# Patient Record
Sex: MALE | Race: WHITE | NOT HISPANIC OR LATINO | Employment: FULL TIME | URBAN - METROPOLITAN AREA
[De-identification: names, ages, dates, MRNs, and addresses within clinical notes are randomized per-mention and may not be internally consistent; named-entity substitution may affect disease eponyms.]

---

## 2023-05-15 LAB
ALANINE AMINOTRANSFERASE (SGPT) (U/L) IN SER/PLAS: 12 U/L (ref 10–52)
ALBUMIN (G/DL) IN SER/PLAS: 4.1 G/DL (ref 3.4–5)
ALKALINE PHOSPHATASE (U/L) IN SER/PLAS: 74 U/L (ref 33–136)
ANION GAP IN SER/PLAS: 11 MMOL/L (ref 10–20)
ASPARTATE AMINOTRANSFERASE (SGOT) (U/L) IN SER/PLAS: 13 U/L (ref 9–39)
BILIRUBIN TOTAL (MG/DL) IN SER/PLAS: 0.3 MG/DL (ref 0–1.2)
CALCIUM (MG/DL) IN SER/PLAS: 9 MG/DL (ref 8.6–10.3)
CARBON DIOXIDE, TOTAL (MMOL/L) IN SER/PLAS: 25 MMOL/L (ref 21–32)
CHLORIDE (MMOL/L) IN SER/PLAS: 106 MMOL/L (ref 98–107)
CHOLESTEROL (MG/DL) IN SER/PLAS: 175 MG/DL (ref 0–199)
CHOLESTEROL IN HDL (MG/DL) IN SER/PLAS: 55.5 MG/DL
CHOLESTEROL/HDL RATIO: 3.2
CREATININE (MG/DL) IN SER/PLAS: 0.9 MG/DL (ref 0.5–1.3)
ERYTHROCYTE DISTRIBUTION WIDTH (RATIO) BY AUTOMATED COUNT: 13.8 % (ref 11.5–14.5)
ERYTHROCYTE MEAN CORPUSCULAR HEMOGLOBIN CONCENTRATION (G/DL) BY AUTOMATED: 32.3 G/DL (ref 32–36)
ERYTHROCYTE MEAN CORPUSCULAR VOLUME (FL) BY AUTOMATED COUNT: 94 FL (ref 80–100)
ERYTHROCYTES (10*6/UL) IN BLOOD BY AUTOMATED COUNT: 4.92 X10E12/L (ref 4.5–5.9)
GFR MALE: >90 ML/MIN/1.73M2
GLUCOSE (MG/DL) IN SER/PLAS: 106 MG/DL (ref 74–99)
HEMATOCRIT (%) IN BLOOD BY AUTOMATED COUNT: 46.4 % (ref 41–52)
HEMOGLOBIN (G/DL) IN BLOOD: 15 G/DL (ref 13.5–17.5)
LDL: 102 MG/DL (ref 0–99)
LEUKOCYTES (10*3/UL) IN BLOOD BY AUTOMATED COUNT: 8.7 X10E9/L (ref 4.4–11.3)
NRBC (PER 100 WBCS) BY AUTOMATED COUNT: 0 /100 WBC (ref 0–0)
PLATELETS (10*3/UL) IN BLOOD AUTOMATED COUNT: 298 X10E9/L (ref 150–450)
POTASSIUM (MMOL/L) IN SER/PLAS: 4.5 MMOL/L (ref 3.5–5.3)
PROTEIN TOTAL: 6.8 G/DL (ref 6.4–8.2)
SODIUM (MMOL/L) IN SER/PLAS: 137 MMOL/L (ref 136–145)
THYROTROPIN (MIU/L) IN SER/PLAS BY DETECTION LIMIT <= 0.05 MIU/L: 1.98 MIU/L (ref 0.44–3.98)
TRIGLYCERIDE (MG/DL) IN SER/PLAS: 90 MG/DL (ref 0–149)
UREA NITROGEN (MG/DL) IN SER/PLAS: 17 MG/DL (ref 6–23)
VLDL: 18 MG/DL (ref 0–40)

## 2023-09-25 PROBLEM — I49.3 PVC (PREMATURE VENTRICULAR CONTRACTION): Status: ACTIVE | Noted: 2023-09-25

## 2023-09-25 PROBLEM — K21.9 GERD (GASTROESOPHAGEAL REFLUX DISEASE): Status: ACTIVE | Noted: 2023-09-25

## 2023-09-25 PROBLEM — I47.29 NSVT (NONSUSTAINED VENTRICULAR TACHYCARDIA) (MULTI): Status: ACTIVE | Noted: 2023-09-25

## 2023-09-25 PROBLEM — E03.9 HYPOTHYROIDISM: Status: ACTIVE | Noted: 2023-09-25

## 2023-09-25 PROBLEM — F41.9 ANXIETY: Status: ACTIVE | Noted: 2023-09-25

## 2023-09-25 PROBLEM — C73 MALIGNANT NEOPLASM OF THYROID GLAND (MULTI): Status: ACTIVE | Noted: 2023-09-25

## 2023-09-25 PROBLEM — R93.1 ABNORMAL ECHOCARDIOGRAM: Status: ACTIVE | Noted: 2023-09-25

## 2023-09-25 PROBLEM — Z96.651 HISTORY OF RIGHT KNEE JOINT REPLACEMENT: Status: ACTIVE | Noted: 2023-09-25

## 2023-09-25 PROBLEM — E66.9 CLASS 2 OBESITY WITH BODY MASS INDEX (BMI) OF 35.0 TO 35.9 IN ADULT: Status: ACTIVE | Noted: 2023-09-25

## 2023-09-25 PROBLEM — N40.0 BPH (BENIGN PROSTATIC HYPERPLASIA): Status: ACTIVE | Noted: 2023-09-25

## 2023-09-25 PROBLEM — D18.01 HEMANGIOMA OF SKIN AND SUBCUTANEOUS TISSUE: Status: ACTIVE | Noted: 2022-02-09

## 2023-09-25 PROBLEM — L02.31 ABSCESS, GLUTEAL, RIGHT: Status: ACTIVE | Noted: 2023-09-25

## 2023-09-25 PROBLEM — K57.32 DIVERTICULITIS OF COLON: Status: ACTIVE | Noted: 2023-09-25

## 2023-09-25 PROBLEM — R93.89 ABNORMAL MRI: Status: ACTIVE | Noted: 2023-09-25

## 2023-09-25 PROBLEM — L82.1 OTHER SEBORRHEIC KERATOSIS: Status: ACTIVE | Noted: 2022-02-09

## 2023-09-25 PROBLEM — S09.90XA TRAUMATIC INJURY OF HEAD: Status: ACTIVE | Noted: 2023-09-25

## 2023-09-25 PROBLEM — E04.1 SOLITARY THYROID NODULE: Status: ACTIVE | Noted: 2023-09-25

## 2023-09-25 PROBLEM — L05.91 INFECTED PILONIDAL CYST: Status: ACTIVE | Noted: 2023-09-25

## 2023-09-25 PROBLEM — L73.2 HIDRADENITIS SUPPURATIVA: Status: ACTIVE | Noted: 2022-02-09

## 2023-09-25 PROBLEM — I10 HYPERTENSION, ESSENTIAL, BENIGN: Status: ACTIVE | Noted: 2023-09-25

## 2023-09-25 PROBLEM — M19.90 DJD (DEGENERATIVE JOINT DISEASE): Status: ACTIVE | Noted: 2023-09-25

## 2023-09-25 PROBLEM — L81.4 OTHER MELANIN HYPERPIGMENTATION: Status: ACTIVE | Noted: 2022-02-09

## 2023-09-25 PROBLEM — E27.8 LEFT ADRENAL MASS (MULTI): Status: ACTIVE | Noted: 2023-09-25

## 2023-09-25 PROBLEM — E66.812 CLASS 2 OBESITY WITH BODY MASS INDEX (BMI) OF 35.0 TO 35.9 IN ADULT: Status: ACTIVE | Noted: 2023-09-25

## 2023-09-25 PROBLEM — D22.5 MELANOCYTIC NEVI OF TRUNK: Status: ACTIVE | Noted: 2022-02-09

## 2023-09-25 PROBLEM — D18.03 LIVER HEMANGIOMA: Status: ACTIVE | Noted: 2023-09-25

## 2023-09-25 PROBLEM — L08.9 LOCALIZED INFECTION OF SKIN: Status: ACTIVE | Noted: 2023-09-25

## 2023-09-25 PROBLEM — S39.92XA INJURY OF COCCYX: Status: ACTIVE | Noted: 2023-09-25

## 2023-09-25 PROBLEM — S69.90XA: Status: ACTIVE | Noted: 2023-09-25

## 2023-09-25 RX ORDER — NAPROXEN SODIUM 220 MG/1
1 TABLET, FILM COATED ORAL DAILY
COMMUNITY
Start: 2022-02-08

## 2023-09-25 RX ORDER — POLYETHYLENE GLYCOL 3350 17 G/17G
17 POWDER, FOR SOLUTION ORAL DAILY PRN
COMMUNITY

## 2023-09-25 RX ORDER — DIAZEPAM 5 MG/1
1 TABLET ORAL DAILY PRN
COMMUNITY
Start: 2021-12-07

## 2023-09-25 RX ORDER — CELECOXIB 200 MG/1
1 CAPSULE ORAL DAILY PRN
COMMUNITY
Start: 2022-03-10 | End: 2023-10-24 | Stop reason: ALTCHOICE

## 2023-09-25 RX ORDER — HYDROGEN PEROXIDE 3 %
1 SOLUTION, NON-ORAL MISCELLANEOUS DAILY
COMMUNITY
Start: 2021-11-02 | End: 2024-02-09

## 2023-09-25 RX ORDER — CLINDAMYCIN PHOSPHATE 11.9 MG/ML
1 SOLUTION TOPICAL
COMMUNITY
Start: 2022-02-09

## 2023-09-25 RX ORDER — MULTIVITAMIN
1 TABLET ORAL DAILY
COMMUNITY

## 2023-09-25 RX ORDER — LISINOPRIL 5 MG/1
1 TABLET ORAL DAILY
COMMUNITY
Start: 2022-02-08 | End: 2024-01-30

## 2023-09-25 RX ORDER — METOPROLOL SUCCINATE 100 MG/1
1 TABLET, EXTENDED RELEASE ORAL DAILY
COMMUNITY
Start: 2022-02-08 | End: 2024-02-06

## 2023-09-25 RX ORDER — CLINDAMYCIN PHOSPHATE 10 UG/ML
LOTION TOPICAL
COMMUNITY

## 2023-09-25 RX ORDER — LEVOTHYROXINE SODIUM 125 UG/1
1 TABLET ORAL DAILY
COMMUNITY
Start: 2014-10-15 | End: 2023-10-13

## 2023-09-25 RX ORDER — LANOLIN ALCOHOL/MO/W.PET/CERES
1 CREAM (GRAM) TOPICAL 2 TIMES DAILY
COMMUNITY
End: 2023-11-29

## 2023-09-28 ENCOUNTER — LAB (OUTPATIENT)
Dept: LAB | Facility: LAB | Age: 68
End: 2023-09-28
Payer: COMMERCIAL

## 2023-09-28 LAB — TOBACCO SCREEN, URINE: NEGATIVE

## 2023-10-13 DIAGNOSIS — E03.9 HYPOTHYROIDISM, UNSPECIFIED: ICD-10-CM

## 2023-10-13 RX ORDER — LEVOTHYROXINE SODIUM 125 UG/1
125 TABLET ORAL DAILY
Qty: 90 TABLET | Refills: 1 | Status: SHIPPED | OUTPATIENT
Start: 2023-10-13

## 2023-10-24 ENCOUNTER — OFFICE VISIT (OUTPATIENT)
Dept: PRIMARY CARE | Facility: CLINIC | Age: 68
End: 2023-10-24
Payer: COMMERCIAL

## 2023-10-24 VITALS
WEIGHT: 286 LBS | TEMPERATURE: 97.3 F | HEIGHT: 74 IN | SYSTOLIC BLOOD PRESSURE: 117 MMHG | DIASTOLIC BLOOD PRESSURE: 81 MMHG | BODY MASS INDEX: 36.7 KG/M2 | HEART RATE: 73 BPM

## 2023-10-24 DIAGNOSIS — R73.01 IFG (IMPAIRED FASTING GLUCOSE): ICD-10-CM

## 2023-10-24 DIAGNOSIS — Z87.891 FORMER SMOKER: ICD-10-CM

## 2023-10-24 DIAGNOSIS — Z00.00 MEDICARE ANNUAL WELLNESS VISIT, SUBSEQUENT: Primary | ICD-10-CM

## 2023-10-24 PROCEDURE — 1036F TOBACCO NON-USER: CPT | Performed by: FAMILY MEDICINE

## 2023-10-24 PROCEDURE — 1170F FXNL STATUS ASSESSED: CPT | Performed by: FAMILY MEDICINE

## 2023-10-24 PROCEDURE — 3008F BODY MASS INDEX DOCD: CPT | Performed by: FAMILY MEDICINE

## 2023-10-24 PROCEDURE — G0439 PPPS, SUBSEQ VISIT: HCPCS | Performed by: FAMILY MEDICINE

## 2023-10-24 PROCEDURE — 1159F MED LIST DOCD IN RCRD: CPT | Performed by: FAMILY MEDICINE

## 2023-10-24 PROCEDURE — 1160F RVW MEDS BY RX/DR IN RCRD: CPT | Performed by: FAMILY MEDICINE

## 2023-10-24 PROCEDURE — 3074F SYST BP LT 130 MM HG: CPT | Performed by: FAMILY MEDICINE

## 2023-10-24 PROCEDURE — 3079F DIAST BP 80-89 MM HG: CPT | Performed by: FAMILY MEDICINE

## 2023-10-24 ASSESSMENT — PATIENT HEALTH QUESTIONNAIRE - PHQ9
2. FEELING DOWN, DEPRESSED OR HOPELESS: NOT AT ALL
1. LITTLE INTEREST OR PLEASURE IN DOING THINGS: NOT AT ALL
SUM OF ALL RESPONSES TO PHQ9 QUESTIONS 1 AND 2: 0

## 2023-10-24 ASSESSMENT — ACTIVITIES OF DAILY LIVING (ADL)
TAKING_MEDICATION: INDEPENDENT
GROCERY_SHOPPING: INDEPENDENT
DOING_HOUSEWORK: INDEPENDENT
BATHING: INDEPENDENT
DRESSING: INDEPENDENT
MANAGING_FINANCES: INDEPENDENT

## 2023-10-24 NOTE — PROGRESS NOTES
Subjective     Patient ID: Julio White is a 68 y.o. male who presents for Annual Exam (AWV):    Problem List Items Addressed This Visit    None     Past Medical History:   Diagnosis Date   • Body mass index (BMI) 37.0-37.9, adult 11/01/2021    BMI 37.0-37.9, adult   • Obesity, unspecified 08/10/2022    Class 2 obesity with body mass index (BMI) of 38.0 to 38.9 in adult   • Other conditions influencing health status 11/01/2021    Patient new to provider   • Personal history of other diseases of the circulatory system 08/04/2022    History of blood pressure problems   • Personal history of other diseases of the respiratory system 10/19/2022    History of acute sinusitis   • Personal history of other endocrine, nutritional and metabolic disease 11/01/2021    History of obesity      Past Surgical History:   Procedure Laterality Date   • OTHER SURGICAL HISTORY  10/05/2021    Hip surgery   • OTHER SURGICAL HISTORY  10/05/2021    Thyroid surgery   • OTHER SURGICAL HISTORY  09/26/2022    Colonoscopy   • TOTAL KNEE ARTHROPLASTY Right 11/2022      Family History   Problem Relation Name Age of Onset   • Cancer Mother     • Other (cardiac disorder) Father        Social History     Socioeconomic History   • Marital status:      Spouse name: Not on file   • Number of children: Not on file   • Years of education: Not on file   • Highest education level: Not on file   Occupational History   • Not on file   Tobacco Use   • Smoking status: Former     Types: Cigarettes   • Smokeless tobacco: Never   Substance and Sexual Activity   • Alcohol use: Yes     Alcohol/week: 12.0 standard drinks of alcohol     Types: 12 Cans of beer per week   • Drug use: Not Currently   • Sexual activity: Not on file   Other Topics Concern   • Not on file   Social History Narrative   • Not on file     Social Determinants of Health     Financial Resource Strain: Not on file   Food Insecurity: Not on file   Transportation Needs: Not on file    Physical Activity: Not on file   Stress: Not on file   Social Connections: Not on file   Intimate Partner Violence: Not on file   Housing Stability: Not on file      Patient has no known allergies.   Current Outpatient Medications   Medication Sig Dispense Refill   • aspirin 81 mg chewable tablet Chew 1 tablet (81 mg) once daily. resume after you complete course of Aspirin 81mg Bid X 30 days, to help reduce your risk for blood clots     • clindamycin (Cleocin T) 1 % external solution 1 Application.     • clindamycin (Cleocin T) 1 % lotion Clindamycin Phosphate 1 % External Lotion   Refills: 0       Active     • diazePAM (Valium) 5 mg tablet Take 1 tablet (5 mg) by mouth once daily as needed.     • esomeprazole (NexIUM) 20 mg DR capsule Take 1 capsule (20 mg) by mouth once daily.     • levothyroxine (Synthroid, Levoxyl) 125 mcg tablet TAKE 1 TABLET BY MOUTH EVERY DAY 90 tablet 1   • lisinopril 5 mg tablet Take 1 tablet (5 mg) by mouth once daily.     • magnesium oxide (Mag-Ox) 400 mg (241.3 mg magnesium) tablet Take 1 tablet (400 mg) by mouth twice a day.     • metoprolol succinate XL (Toprol-XL) 100 mg 24 hr tablet Take 1 tablet (100 mg) by mouth once daily.     • multivitamin tablet Take 1 tablet by mouth once daily.     • polyethylene glycol (Miralax) 17 gram/dose powder Take 17 g by mouth once daily as needed.       No current facility-administered medications for this visit.       Immunization History   Administered Date(s) Administered   • Flu vaccine (IIV4), preservative free *Check age/dose* 09/30/2018, 10/01/2019   • Influenza, High Dose Seasonal, Preservative Free 10/19/2021   • Influenza, Unspecified 10/29/2009, 10/25/2011, 10/01/2014, 10/01/2015, 10/01/2016, 10/01/2021   • Influenza, seasonal, injectable 10/01/2020, 10/01/2023   • Moderna SARS-CoV-2 Vaccination 12/31/2020, 02/11/2021, 02/28/2021, 11/19/2021   • Novel influenza-H1N1-09, preservative-free 11/05/2009, 10/07/2022   • Pfizer Purple Cap  SARS-CoV-2 09/20/2022   • Pneumococcal conjugate vaccine, 13-valent (PREVNAR 13) 10/19/2021   • Pneumococcal polysaccharide vaccine, 23-valent, age 2 years and older (PNEUMOVAX 23) 10/27/2022        Review of Systems     Vitals:    10/24/23 1500   BP: 117/81   Pulse: 73   Temp: 36.3 °C (97.3 °F)     Vitals:    10/24/23 1500   Weight: 130 kg (286 lb)      Physical Exam     ASSESSMENT/PLAN:         Scribe Attestation  By signing my name below, I, Salma Camara LPN , Scribe   attest that this documentation has been prepared under the direction and in the presence of Jim Keller MD.

## 2023-10-26 ASSESSMENT — ENCOUNTER SYMPTOMS
ALLERGIC/IMMUNOLOGIC NEGATIVE: 1
EYES NEGATIVE: 1
GASTROINTESTINAL NEGATIVE: 1
CONSTITUTIONAL NEGATIVE: 1
CARDIOVASCULAR NEGATIVE: 1
ENDOCRINE NEGATIVE: 1
HEMATOLOGIC/LYMPHATIC NEGATIVE: 1
RESPIRATORY NEGATIVE: 1
PSYCHIATRIC NEGATIVE: 1
MUSCULOSKELETAL NEGATIVE: 1

## 2023-10-26 NOTE — PROGRESS NOTES
Isrrael Ba is here today for an annual wellness visit.  He states that he is feeling well and has no new complaints at the present time.  He continues on his medications as noted without side effects.  He sees Dr. Adarsh Klein for cardiology care and Dr. Mireles for electrophysiology care.  He has not had any chest pain shortness of breath lightheadedness or syncope    Patient ID: Julio White is a 68 y.o. male who presents for Annual Exam (AWV):    Problem List Items Addressed This Visit       Medicare annual wellness visit, subsequent - Primary    BMI 36.0-36.9,adult    Relevant Orders    Comprehensive Metabolic Panel    Hemoglobin A1C    IFG (impaired fasting glucose)    Relevant Orders    Comprehensive Metabolic Panel    Hemoglobin A1C    Former smoker      Past Medical History:   Diagnosis Date    Body mass index (BMI) 37.0-37.9, adult 11/01/2021    BMI 37.0-37.9, adult    Obesity, unspecified 08/10/2022    Class 2 obesity with body mass index (BMI) of 38.0 to 38.9 in adult    Other conditions influencing health status 11/01/2021    Patient new to provider    Personal history of other diseases of the circulatory system 08/04/2022    History of blood pressure problems    Personal history of other diseases of the respiratory system 10/19/2022    History of acute sinusitis    Personal history of other endocrine, nutritional and metabolic disease 11/01/2021    History of obesity      Past Surgical History:   Procedure Laterality Date    OTHER SURGICAL HISTORY  10/05/2021    Hip surgery    OTHER SURGICAL HISTORY  10/05/2021    Thyroid surgery    OTHER SURGICAL HISTORY  09/26/2022    Colonoscopy    TOTAL KNEE ARTHROPLASTY Right 11/2022      Family History   Problem Relation Name Age of Onset    Cancer Mother      Other (cardiac disorder) Father        Social History     Socioeconomic History    Marital status:      Spouse name: Not on file    Number of children: Not on file    Years of education: Not on  file    Highest education level: Not on file   Occupational History    Not on file   Tobacco Use    Smoking status: Former     Types: Cigarettes    Smokeless tobacco: Never   Substance and Sexual Activity    Alcohol use: Yes     Alcohol/week: 12.0 standard drinks of alcohol     Types: 12 Cans of beer per week    Drug use: Not Currently    Sexual activity: Not on file   Other Topics Concern    Not on file   Social History Narrative    Not on file     Social Determinants of Health     Financial Resource Strain: Not on file   Food Insecurity: Not on file   Transportation Needs: Not on file   Physical Activity: Not on file   Stress: Not on file   Social Connections: Not on file   Intimate Partner Violence: Not on file   Housing Stability: Not on file      Patient has no known allergies.   Current Outpatient Medications   Medication Sig Dispense Refill    aspirin 81 mg chewable tablet Chew 1 tablet (81 mg) once daily. resume after you complete course of Aspirin 81mg Bid X 30 days, to help reduce your risk for blood clots      clindamycin (Cleocin T) 1 % external solution 1 Application.      clindamycin (Cleocin T) 1 % lotion Clindamycin Phosphate 1 % External Lotion   Refills: 0       Active      diazePAM (Valium) 5 mg tablet Take 1 tablet (5 mg) by mouth once daily as needed.      esomeprazole (NexIUM) 20 mg DR capsule Take 1 capsule (20 mg) by mouth once daily.      levothyroxine (Synthroid, Levoxyl) 125 mcg tablet TAKE 1 TABLET BY MOUTH EVERY DAY 90 tablet 1    lisinopril 5 mg tablet Take 1 tablet (5 mg) by mouth once daily.      magnesium oxide (Mag-Ox) 400 mg (241.3 mg magnesium) tablet Take 1 tablet (400 mg) by mouth twice a day.      metoprolol succinate XL (Toprol-XL) 100 mg 24 hr tablet Take 1 tablet (100 mg) by mouth once daily.      multivitamin tablet Take 1 tablet by mouth once daily.      polyethylene glycol (Miralax) 17 gram/dose powder Take 17 g by mouth once daily as needed.       No current  facility-administered medications for this visit.       Immunization History   Administered Date(s) Administered    Flu vaccine (IIV4), preservative free *Check age/dose* 09/30/2018, 10/01/2019    Influenza, High Dose Seasonal, Preservative Free 10/19/2021    Influenza, Unspecified 10/29/2009, 10/25/2011, 10/01/2014, 10/01/2015, 10/01/2016, 10/01/2021    Influenza, seasonal, injectable 10/01/2020, 10/01/2023    Moderna SARS-CoV-2 Vaccination 12/31/2020, 02/11/2021, 02/28/2021, 11/19/2021    Novel influenza-H1N1-09, preservative-free 11/05/2009, 10/07/2022    Pfizer Purple Cap SARS-CoV-2 09/20/2022    Pneumococcal conjugate vaccine, 13-valent (PREVNAR 13) 10/19/2021    Pneumococcal polysaccharide vaccine, 23-valent, age 2 years and older (PNEUMOVAX 23) 10/27/2022        Review of Systems   Constitutional: Negative.    HENT: Negative.     Eyes: Negative.    Respiratory: Negative.     Cardiovascular: Negative.    Gastrointestinal: Negative.    Endocrine: Negative.    Genitourinary: Negative.    Musculoskeletal: Negative.    Skin: Negative.    Allergic/Immunologic: Negative.    Hematological: Negative.    Psychiatric/Behavioral: Negative.     All other systems reviewed and are negative.       Vitals:    10/24/23 1500   BP: 117/81   Pulse: 73   Temp: 36.3 °C (97.3 °F)     Vitals:    10/24/23 1500   Weight: 130 kg (286 lb)      Physical Exam  Constitutional:       General: He is not in acute distress.     Appearance: Normal appearance.   Neck:      Vascular: No carotid bruit.   Cardiovascular:      Rate and Rhythm: Normal rate and regular rhythm.      Pulses: Normal pulses.      Heart sounds: Normal heart sounds. No murmur heard.     No friction rub. No gallop.   Pulmonary:      Effort: Pulmonary effort is normal.      Breath sounds: Normal breath sounds.   Abdominal:      General: Abdomen is flat.      Palpations: Abdomen is soft. There is no mass.      Tenderness: There is no abdominal tenderness.   Musculoskeletal:       Cervical back: Neck supple.   Neurological:      Mental Status: He is alert.   Psychiatric:         Mood and Affect: Mood normal.         Thought Content: Thought content normal.          ASSESSMENT/PLAN: Annual wellness visit  Intermittent hyperglycemia  Hypertension stable  Hyperlipidemia  Hypothyroid    Plan-check CMP and A1c.  Continue current meds noted  Exercise regularly  Follow-up with cardiology and electrophysiology  Colonoscopy and ophthalmology evaluations up-to-date  Patient will need Tdap and shingles vaccination updates  Follow-up in 6 months and call as needed

## 2023-10-30 ENCOUNTER — OFFICE VISIT (OUTPATIENT)
Dept: CARDIOLOGY | Facility: CLINIC | Age: 68
End: 2023-10-30
Payer: COMMERCIAL

## 2023-10-30 VITALS
WEIGHT: 289 LBS | SYSTOLIC BLOOD PRESSURE: 118 MMHG | TEMPERATURE: 97.5 F | HEIGHT: 74 IN | DIASTOLIC BLOOD PRESSURE: 80 MMHG | BODY MASS INDEX: 37.09 KG/M2 | HEART RATE: 71 BPM

## 2023-10-30 DIAGNOSIS — I47.29 NSVT (NONSUSTAINED VENTRICULAR TACHYCARDIA) (MULTI): ICD-10-CM

## 2023-10-30 DIAGNOSIS — Z87.891 FORMER SMOKER: ICD-10-CM

## 2023-10-30 DIAGNOSIS — I71.21 AORTIC ROOT ANEURYSM (CMS-HCC): ICD-10-CM

## 2023-10-30 DIAGNOSIS — I49.3 PVC (PREMATURE VENTRICULAR CONTRACTION): Primary | ICD-10-CM

## 2023-10-30 DIAGNOSIS — I10 HYPERTENSION, ESSENTIAL, BENIGN: ICD-10-CM

## 2023-10-30 DIAGNOSIS — R60.0 BILATERAL LEG EDEMA: ICD-10-CM

## 2023-10-30 DIAGNOSIS — L81.4 OTHER MELANIN HYPERPIGMENTATION: ICD-10-CM

## 2023-10-30 PROBLEM — Q25.43 AORTIC ROOT ANEURYSM: Status: ACTIVE | Noted: 2023-10-30

## 2023-10-30 PROCEDURE — 93000 ELECTROCARDIOGRAM COMPLETE: CPT | Performed by: INTERNAL MEDICINE

## 2023-10-30 PROCEDURE — 1159F MED LIST DOCD IN RCRD: CPT | Performed by: INTERNAL MEDICINE

## 2023-10-30 PROCEDURE — 3008F BODY MASS INDEX DOCD: CPT | Performed by: INTERNAL MEDICINE

## 2023-10-30 PROCEDURE — 1036F TOBACCO NON-USER: CPT | Performed by: INTERNAL MEDICINE

## 2023-10-30 PROCEDURE — 1160F RVW MEDS BY RX/DR IN RCRD: CPT | Performed by: INTERNAL MEDICINE

## 2023-10-30 PROCEDURE — 3074F SYST BP LT 130 MM HG: CPT | Performed by: INTERNAL MEDICINE

## 2023-10-30 PROCEDURE — 99214 OFFICE O/P EST MOD 30 MIN: CPT | Performed by: INTERNAL MEDICINE

## 2023-10-30 PROCEDURE — 3079F DIAST BP 80-89 MM HG: CPT | Performed by: INTERNAL MEDICINE

## 2023-10-30 ASSESSMENT — ENCOUNTER SYMPTOMS
DIFFICULTY URINATING: 1
ACTIVITY CHANGE: 0
HEMATOLOGIC/LYMPHATIC NEGATIVE: 1
GASTROINTESTINAL NEGATIVE: 1
NEUROLOGICAL NEGATIVE: 1
CHILLS: 0
FATIGUE: 0
ENDOCRINE NEGATIVE: 1
PSYCHIATRIC NEGATIVE: 1
FEVER: 0
HEMATURIA: 0
CARDIOVASCULAR NEGATIVE: 1
RESPIRATORY NEGATIVE: 1
DYSURIA: 0
ARTHRALGIAS: 1
ALLERGIC/IMMUNOLOGIC NEGATIVE: 1
RHINORRHEA: 1
EYES NEGATIVE: 1

## 2023-10-30 ASSESSMENT — PATIENT HEALTH QUESTIONNAIRE - PHQ9
SUM OF ALL RESPONSES TO PHQ9 QUESTIONS 1 AND 2: 0
2. FEELING DOWN, DEPRESSED OR HOPELESS: NOT AT ALL
1. LITTLE INTEREST OR PLEASURE IN DOING THINGS: NOT AT ALL

## 2023-12-13 ENCOUNTER — TELEMEDICINE (OUTPATIENT)
Dept: PRIMARY CARE | Facility: CLINIC | Age: 68
End: 2023-12-13
Payer: COMMERCIAL

## 2023-12-13 DIAGNOSIS — J06.9 URTI (ACUTE UPPER RESPIRATORY INFECTION): Primary | ICD-10-CM

## 2023-12-13 PROCEDURE — 99441 PR PHYS/QHP TELEPHONE EVALUATION 5-10 MIN: CPT | Performed by: INTERNAL MEDICINE

## 2023-12-13 RX ORDER — AZITHROMYCIN 1 G/1
1 POWDER, FOR SUSPENSION ORAL ONCE
Qty: 1 PACKET | Refills: 0 | Status: SHIPPED | OUTPATIENT
Start: 2023-12-13 | End: 2023-12-13

## 2023-12-13 NOTE — PROGRESS NOTES
Assessment/Plan   Problem List Items Addressed This Visit       URTI (acute upper respiratory infection) - Primary    Relevant Medications    azithromycin (Zithromax) 1 gram powder     He says he has taken azithromycin before without any problems and he has no allergy to any antibiotic  It was discussed about various other blood work that has been requested by Dr. Taylor check will be done sometimes he said he is also going to have a CT scan as advised by the cardiologist in relation to the abnormality in the ascending aorta  Follow-up as previously arranged  Subjective   Patient ID: Julio White is a 68 y.o. male who presents for Sinusitis (Sx's include, cough, lower lungs hurt, brown productive cough, aches and pains.  He did a home COVID test, it was negative.).    Past Surgical History:   Procedure Laterality Date    OTHER SURGICAL HISTORY  10/05/2021    Hip surgery    OTHER SURGICAL HISTORY  10/05/2021    Thyroid surgery    OTHER SURGICAL HISTORY  2022    Colonoscopy    TOTAL KNEE ARTHROPLASTY Right 2022      Family History   Problem Relation Name Age of Onset    Cancer Mother      Other (cardiac disorder) Father        Social History     Socioeconomic History    Marital status:      Spouse name: Not on file    Number of children: Not on file    Years of education: Not on file    Highest education level: Not on file   Occupational History    Not on file   Tobacco Use    Smoking status: Former     Types: Cigarettes     Quit date:      Years since quittin.9    Smokeless tobacco: Never   Substance and Sexual Activity    Alcohol use: Yes     Alcohol/week: 12.0 standard drinks of alcohol     Types: 12 Cans of beer per week    Drug use: Not Currently    Sexual activity: Not on file   Other Topics Concern    Not on file   Social History Narrative    Not on file     Social Determinants of Health     Financial Resource Strain: Not on file   Food Insecurity: Not on file   Transportation Needs:  Not on file   Physical Activity: Not on file   Stress: Not on file   Social Connections: Not on file   Intimate Partner Violence: Not on file   Housing Stability: Not on file      Patient has no known allergies.   Current Outpatient Medications   Medication Sig Dispense Refill    aspirin 81 mg chewable tablet Chew 1 tablet (81 mg) once daily. resume after you complete course of Aspirin 81mg Bid X 30 days, to help reduce your risk for blood clots      clindamycin (Cleocin T) 1 % external solution 1 Application.      clindamycin (Cleocin T) 1 % lotion Clindamycin Phosphate 1 % External Lotion   Refills: 0       Active      diazePAM (Valium) 5 mg tablet Take 1 tablet (5 mg) by mouth once daily as needed.      esomeprazole (NexIUM) 20 mg DR capsule Take 1 capsule (20 mg) by mouth once daily.      levothyroxine (Synthroid, Levoxyl) 125 mcg tablet TAKE 1 TABLET BY MOUTH EVERY DAY 90 tablet 1    lisinopril 5 mg tablet Take 1 tablet (5 mg) by mouth once daily.      magnesium oxide (Mag-Ox) 400 mg (241.3 mg magnesium) tablet TAKE 1 TABLET BY MOUTH TWICE A  tablet 0    metoprolol succinate XL (Toprol-XL) 100 mg 24 hr tablet Take 1 tablet (100 mg) by mouth once daily.      multivitamin tablet Take 1 tablet by mouth once daily.      polyethylene glycol (Miralax) 17 gram/dose powder Take 17 g by mouth once daily as needed.      azithromycin (Zithromax) 1 gram powder Take 1 packet (1 g) by mouth 1 time for 1 dose. 1 packet 0     No current facility-administered medications for this visit.      There were no vitals filed for this visit.   Problem List Items Addressed This Visit       URTI (acute upper respiratory infection) - Primary    Relevant Medications    azithromycin (Zithromax) 1 gram powder      No orders of the defined types were placed in this encounter.       HPI  This is a 68-year-old gentleman with a history of dilated aortic root, hypothyroidism hypertension gastroesophageal reflux disease presented with 3 to  "4 days history of cough congestion sinus problems with purulent expectoration without any fever no nausea vomiting diarrhea and had done COVID test which was negative at home    ROS otherwise has been negative  Past medical history reviewed  Social and family history reviewed  Allergies and medications reviewed  Recent labs reviewed  Vital signs not done    PHYSICAL EXAM  Examination limited by telephonic conversation  No distress comfortable awake alert orientated  Total time on telephone conversation 5 minutes      No results found for: \"PR1\", \"BMPR1A\", \"CMPLAS\", \"KC6ZVYOM\", \"KPSAT\"   Lab Results   Component Value Date    CHOL 175 05/15/2023    CHHDL 3.2 05/15/2023                "

## 2023-12-15 DIAGNOSIS — J06.9 URTI (ACUTE UPPER RESPIRATORY INFECTION): Primary | ICD-10-CM

## 2023-12-15 RX ORDER — AZITHROMYCIN 250 MG/1
TABLET, FILM COATED ORAL
Qty: 6 TABLET | Refills: 0 | Status: SHIPPED | OUTPATIENT
Start: 2023-12-15 | End: 2023-12-20

## 2024-01-02 ENCOUNTER — APPOINTMENT (OUTPATIENT)
Dept: RADIOLOGY | Facility: HOSPITAL | Age: 69
End: 2024-01-02
Payer: COMMERCIAL

## 2024-01-04 ENCOUNTER — TELEPHONE (OUTPATIENT)
Dept: PRIMARY CARE | Facility: CLINIC | Age: 69
End: 2024-01-04

## 2024-01-04 ENCOUNTER — LAB (OUTPATIENT)
Dept: LAB | Facility: LAB | Age: 69
End: 2024-01-04
Payer: COMMERCIAL

## 2024-01-04 DIAGNOSIS — I71.21 AORTIC ROOT ANEURYSM (CMS-HCC): ICD-10-CM

## 2024-01-04 DIAGNOSIS — R73.01 IFG (IMPAIRED FASTING GLUCOSE): ICD-10-CM

## 2024-01-04 LAB
ALBUMIN SERPL BCP-MCNC: 4.2 G/DL (ref 3.4–5)
ALP SERPL-CCNC: 76 U/L (ref 33–136)
ALT SERPL W P-5'-P-CCNC: 23 U/L (ref 10–52)
ANION GAP SERPL CALC-SCNC: 12 MMOL/L (ref 10–20)
AST SERPL W P-5'-P-CCNC: 16 U/L (ref 9–39)
BILIRUB SERPL-MCNC: 0.5 MG/DL (ref 0–1.2)
BUN SERPL-MCNC: 16 MG/DL (ref 6–23)
CALCIUM SERPL-MCNC: 8.8 MG/DL (ref 8.6–10.3)
CHLORIDE SERPL-SCNC: 103 MMOL/L (ref 98–107)
CO2 SERPL-SCNC: 27 MMOL/L (ref 21–32)
CREAT SERPL-MCNC: 0.99 MG/DL (ref 0.5–1.3)
EST. AVERAGE GLUCOSE BLD GHB EST-MCNC: 114 MG/DL
GFR SERPL CREATININE-BSD FRML MDRD: 82 ML/MIN/1.73M*2
GLUCOSE SERPL-MCNC: 97 MG/DL (ref 74–99)
HBA1C MFR BLD: 5.6 %
POTASSIUM SERPL-SCNC: 4.4 MMOL/L (ref 3.5–5.3)
PROT SERPL-MCNC: 6.6 G/DL (ref 6.4–8.2)
SODIUM SERPL-SCNC: 138 MMOL/L (ref 136–145)

## 2024-01-04 PROCEDURE — 36415 COLL VENOUS BLD VENIPUNCTURE: CPT

## 2024-01-04 PROCEDURE — 83036 HEMOGLOBIN GLYCOSYLATED A1C: CPT

## 2024-01-04 PROCEDURE — 80053 COMPREHEN METABOLIC PANEL: CPT

## 2024-01-04 NOTE — TELEPHONE ENCOUNTER
----- Message from Jim Keller MD sent at 1/4/2024  5:19 PM EST -----  Sugar control good with A1C 5.6

## 2024-01-12 ENCOUNTER — HOSPITAL ENCOUNTER (OUTPATIENT)
Dept: RADIOLOGY | Facility: HOSPITAL | Age: 69
Discharge: HOME | End: 2024-01-12
Payer: COMMERCIAL

## 2024-01-12 DIAGNOSIS — I26.93 SINGLE SUBSEGMENTAL PULMONARY EMBOLISM WITHOUT ACUTE COR PULMONALE (MULTI): Primary | ICD-10-CM

## 2024-01-12 DIAGNOSIS — I71.21 AORTIC ROOT ANEURYSM (CMS-HCC): ICD-10-CM

## 2024-01-12 PROCEDURE — 2550000001 HC RX 255 CONTRASTS: Performed by: INTERNAL MEDICINE

## 2024-01-12 PROCEDURE — 71275 CT ANGIOGRAPHY CHEST: CPT | Performed by: RADIOLOGY

## 2024-01-12 PROCEDURE — 71275 CT ANGIOGRAPHY CHEST: CPT

## 2024-01-12 RX ORDER — APIXABAN 5 MG (74)
KIT ORAL
Qty: 74 TABLET | Refills: 0 | Status: SHIPPED | OUTPATIENT
Start: 2024-01-12 | End: 2024-02-02 | Stop reason: SDUPTHER

## 2024-01-12 RX ADMIN — IOHEXOL 90 ML: 350 INJECTION, SOLUTION INTRAVENOUS at 08:00

## 2024-01-15 ENCOUNTER — TELEPHONE (OUTPATIENT)
Dept: PRIMARY CARE | Facility: CLINIC | Age: 69
End: 2024-01-15
Payer: COMMERCIAL

## 2024-01-15 ENCOUNTER — TELEPHONE (OUTPATIENT)
Dept: CARDIOLOGY | Facility: CLINIC | Age: 69
End: 2024-01-15
Payer: COMMERCIAL

## 2024-01-15 NOTE — TELEPHONE ENCOUNTER
----- Message from Tracy Vergara MD sent at 1/12/2024  3:28 PM EST -----  Call patient regarding CTA of aorta showing pulmonary embolism. Left message as he didn't answer phone and asked him to call back if asymptomatic or go to ER if any symptoms. I asked the pulmonologist / CCM to look at images and see if he thought it was a true PE. Waiting to hear back. Make sure we follow-up on Monday.

## 2024-01-15 NOTE — TELEPHONE ENCOUNTER
Pt called requesting a phone call from you regarding CT Angio. He said he would like to speak directly to you.

## 2024-01-16 NOTE — PROGRESS NOTES
Patient:  Julio White  YOB: 1955  MRN: [id number]       Chief Complaint/Active Symptoms:       Julio White is a 68 y.o. male who returns today for cardiac follow-up.    Here for annual follow-up. No chest pain or discomfort, shortness of breath. No palptiations, dizziness or lightheadedness. No orthopnea or PND, no edema. No claudications, stroke or TIA symptoms.     No problems with his knee replacements.       Review of Systems   Constitutional:  Negative for activity change, chills, fatigue and fever.   HENT:  Positive for postnasal drip and rhinorrhea. Negative for congestion.    Eyes: Negative.    Respiratory: Negative.     Cardiovascular: Negative.    Gastrointestinal: Negative.    Endocrine: Negative.    Genitourinary:  Positive for difficulty urinating and enuresis. Negative for dysuria and hematuria.   Musculoskeletal:  Positive for arthralgias.   Skin: Negative.    Allergic/Immunologic: Negative.    Neurological: Negative.    Hematological: Negative.    Psychiatric/Behavioral: Negative.     All other systems reviewed and are negative.      Objective:     Vitals:    10/30/23 1100   BP: 118/80   Pulse: 71   Temp: 36.4 °C (97.5 °F)       Vitals:    10/30/23 1100   Weight: 131 kg (289 lb)       Allergies:     No Known Allergies       Medications:     Current Outpatient Medications   Medication Instructions    aspirin 81 mg chewable tablet 1 tablet, oral, Daily, resume after you complete course of Aspirin 81mg Bid X 30 days, to help reduce your risk for blood clots    clindamycin (Cleocin T) 1 % external solution 1 Application    clindamycin (Cleocin T) 1 % lotion Clindamycin Phosphate 1 % External Lotion   Refills: 0       Active    diazePAM (Valium) 5 mg tablet 1 tablet, oral, Daily PRN    esomeprazole (NexIUM) 20 mg DR capsule 1 capsule, oral, Daily    levothyroxine (SYNTHROID, LEVOXYL) 125 mcg, oral, Daily    lisinopril 5 mg tablet 1 tablet, oral, Daily    magnesium oxide (Mag-Ox) 400 mg  Called mom no answer   "(241.3 mg magnesium) tablet 1 tablet, oral, 2 times daily    metoprolol succinate XL (Toprol-XL) 100 mg 24 hr tablet 1 tablet, oral, Daily    multivitamin tablet 1 tablet, oral, Daily    polyethylene glycol (MIRALAX) 17 g, oral, Daily PRN       Physical Examination:   GENERAL:  Well developed, well nourished, in no acute distress.  HEENT: NC AT, EOMI with anicteric sclera  NECK:  Supple, no JVD, no bruit.  CHEST:  Symmetric and nontender.  LUNGS:  Clear to auscultation bilaterally, normal respiratory effort.  HEART: PMI is nonpalpable.  There is a regular rhythm occasionally irregular with a normal S1 and S2 with no appreciable S3.  There is a soft systolic ejection murmur at the upper sternal border no diastolic murmur.  Pedal pulses are normal there is trace to 1+ ankle edema with venous varicosities and spider veins.    ABDOMEN: Soft, NT, ND without palpable organomegaly or bruits  EXTREMITIES:  Warm with good color, no clubbing or cyanosis.  There is trace to 1+ ankle edema noted.  PERIPHERAL VASCULAR:  Pulses present and equally palpable; 2+ throughout.  MUSCULOSKELETAL: Osteoarthritic changes  NEURO/PSYCH:  Alert and oriented times three with approppriate behavior and responses. Nonfocal motor examination with normal gait and ambulation  Lymph: No significant palpable lymphadenopathy  Skin: no rash or lesions on exposed skin or reported.    Lab:     CBC:   Lab Results   Component Value Date    WBC 8.7 05/15/2023    RBC 4.92 05/15/2023    HGB 15.0 05/15/2023    HCT 46.4 05/15/2023     05/15/2023        CMP:    Lab Results   Component Value Date     05/15/2023    K 4.5 05/15/2023     05/15/2023    CO2 25 05/15/2023    BUN 17 05/15/2023    CREATININE 0.90 05/15/2023    GLUCOSE 106 (H) 05/15/2023    CALCIUM 9.0 05/15/2023       Magnesium:    No results found for: \"MG\"    Lipid Profile:    Lab Results   Component Value Date    TRIG 90 05/15/2023    HDL 55.5 05/15/2023       TSH:    Lab Results " "  Component Value Date    TSH 1.98 05/15/2023       BNP:   No results found for: \"BNP\"     PT/INR:    Lab Results   Component Value Date    PROTIME 10.2 02/17/2022    INR 0.9 02/17/2022       HgBA1c:    No results found for: \"HGBA1C\"    BMP:  Lab Results   Component Value Date     05/15/2023     11/16/2022     11/15/2022    K 4.5 05/15/2023    K 3.9 11/16/2022    K 4.2 11/15/2022     05/15/2023     11/16/2022     11/15/2022    CO2 25 05/15/2023    CO2 25 11/16/2022    CO2 25 11/15/2022    BUN 17 05/15/2023    BUN 15 11/16/2022    BUN 21 11/15/2022    CREATININE 0.90 05/15/2023    CREATININE 0.76 11/16/2022    CREATININE 1.00 11/15/2022       Cardiac Enzymes:    No results found for: \"TROPHS\"    Hepatic Function Panel:    Lab Results   Component Value Date    ALKPHOS 74 05/15/2023    ALT 12 05/15/2023    AST 13 05/15/2023    PROT 6.8 05/15/2023    BILITOT 0.3 05/15/2023         Diagnostic Studies:     No echocardiogram results found for the past 12 months    No nuclear medicine results found for the past 12 months    No valid procedures specified.    EKG:   No results found for: \"EKG\"  EKG today normal sinus rhythm with first-degree AV block and a single PVC.  Radiology:     CT angio chest w and wo IV contrast    (Results Pending)         ASSESSMENT     Problem List Items Addressed This Visit       PVC (premature ventricular contraction) - Primary    Other melanin hyperpigmentation    NSVT (nonsustained ventricular tachycardia) (CMS/HCC)    Relevant Orders    ECG 12 lead (Clinic Performed) (Completed)    Hypertension, essential, benign    Former smoker    Bilateral leg edema    Aortic root aneurysm (CMS/HCC)    Relevant Orders    CT angio chest w and wo IV contrast    Basic Metabolic Panel     Other Visit Diagnoses       BMI 37.0-37.9, adult                PLAN     1.  PVC.  History of nonsustained VT.  Continue beta-blocker therapy and intermittent follow-up with " electrophysiology.  2.  Benign essential hypertension.  Blood pressures are controlled continue his current medical regimen reinforced diet and exercise and low-salt diet.  3.  Aortic root aneurysm.  Its been 2 years since his last evaluation we recommend CT angiography to follow-up his aortic root.  The patient does not want to go through MRI as an alternative.  4.  Bilateral leg edema.  The patient has signs of venous hypertension with spider veins and some varicose veins.  We recommended low-salt diet and knee-high graduated support stockings.  5.  Tobacco and weight status.  The patient is a former smoker who remains tobacco free but is moderately obese.  We have encouraged heart healthy Mediterranean diet.    If there is no sign of any change in his aortic root aneurysm we will see him in follow-up in a year he will follow-up with electrophysiology in the next 6 months.

## 2024-01-25 ENCOUNTER — OFFICE VISIT (OUTPATIENT)
Dept: PRIMARY CARE | Facility: CLINIC | Age: 69
End: 2024-01-25
Payer: COMMERCIAL

## 2024-01-25 VITALS
HEIGHT: 74 IN | TEMPERATURE: 97 F | BODY MASS INDEX: 35.81 KG/M2 | WEIGHT: 279 LBS | HEART RATE: 73 BPM | SYSTOLIC BLOOD PRESSURE: 99 MMHG | DIASTOLIC BLOOD PRESSURE: 69 MMHG

## 2024-01-25 DIAGNOSIS — I27.82 OTHER CHRONIC PULMONARY EMBOLISM, UNSPECIFIED WHETHER ACUTE COR PULMONALE PRESENT (MULTI): ICD-10-CM

## 2024-01-25 PROCEDURE — 1036F TOBACCO NON-USER: CPT | Performed by: FAMILY MEDICINE

## 2024-01-25 PROCEDURE — 99214 OFFICE O/P EST MOD 30 MIN: CPT | Performed by: FAMILY MEDICINE

## 2024-01-25 PROCEDURE — 1159F MED LIST DOCD IN RCRD: CPT | Performed by: FAMILY MEDICINE

## 2024-01-25 PROCEDURE — 3074F SYST BP LT 130 MM HG: CPT | Performed by: FAMILY MEDICINE

## 2024-01-25 PROCEDURE — 1157F ADVNC CARE PLAN IN RCRD: CPT | Performed by: FAMILY MEDICINE

## 2024-01-25 PROCEDURE — 3078F DIAST BP <80 MM HG: CPT | Performed by: FAMILY MEDICINE

## 2024-01-25 PROCEDURE — 1160F RVW MEDS BY RX/DR IN RCRD: CPT | Performed by: FAMILY MEDICINE

## 2024-01-25 PROCEDURE — 3008F BODY MASS INDEX DOCD: CPT | Performed by: FAMILY MEDICINE

## 2024-01-28 ASSESSMENT — ENCOUNTER SYMPTOMS
EYES NEGATIVE: 1
RESPIRATORY NEGATIVE: 1
HEMATOLOGIC/LYMPHATIC NEGATIVE: 1
PSYCHIATRIC NEGATIVE: 1
ENDOCRINE NEGATIVE: 1
MUSCULOSKELETAL NEGATIVE: 1
GASTROINTESTINAL NEGATIVE: 1
ALLERGIC/IMMUNOLOGIC NEGATIVE: 1
CONSTITUTIONAL NEGATIVE: 1
CARDIOVASCULAR NEGATIVE: 1

## 2024-01-28 NOTE — PROGRESS NOTES
Isrrael Tan is here accompanied by his wife for a follow-up on a incidentally found pulmonary embolus.  The patient had a CT angio of his chest on January 12, 2024.  This showed stable dilatation of the aortic root and ascending aorta.  However it also showed a subsegmental right upper lobe pulmonary embolus.  He was immediately called by Dr. Adarsh Klein and placed on Eliquis.  He has had no chest pain or cough.  He states for the last several months he has had slight shortness of breath when exercising.  This has not been severe enough to make note of.  He has had no leg pain or swelling.  He did have a viral syndrome 4 to 6 weeks ago but was COVID-negative at that time.  He had knee surgery about 1 year ago there is no family history of DVT PE or blood clotting disorders.  Any long car or plane rides or any periods of immobilization.  He continues on his meds noted.  He is currently on Eliquis starter pack as directed.  He has no other complaints.  Patient ID: Julio White is a 68 y.o. male who presents for Results:    Problem List Items Addressed This Visit    None  Visit Diagnoses       Other chronic pulmonary embolism, unspecified whether acute cor pulmonale present (CMS/[facility])        Relevant Orders    Vascular US lower extremity venous duplex bilateral           Past Medical History:   Diagnosis Date    Body mass index (BMI) 37.0-37.9, adult 11/01/2021    BMI 37.0-37.9, adult    Obesity, unspecified 08/10/2022    Class 2 obesity with body mass index (BMI) of 38.0 to 38.9 in adult    Other conditions influencing health status 11/01/2021    Patient new to provider    Personal history of other diseases of the circulatory system 08/04/2022    History of blood pressure problems    Personal history of other diseases of the respiratory system 10/19/2022    History of acute sinusitis    Personal history of other endocrine, nutritional and metabolic disease 11/01/2021    History of obesity      Past Surgical  History:   Procedure Laterality Date    OTHER SURGICAL HISTORY  10/05/2021    Hip surgery    OTHER SURGICAL HISTORY  10/05/2021    Thyroid surgery    OTHER SURGICAL HISTORY  2022    Colonoscopy    TOTAL KNEE ARTHROPLASTY Right 2022      Family History   Problem Relation Name Age of Onset    Cancer Mother      Other (cardiac disorder) Father        Social History     Socioeconomic History    Marital status:      Spouse name: Not on file    Number of children: Not on file    Years of education: Not on file    Highest education level: Not on file   Occupational History    Not on file   Tobacco Use    Smoking status: Former     Types: Cigarettes     Quit date:      Years since quittin.0    Smokeless tobacco: Never   Substance and Sexual Activity    Alcohol use: Yes     Alcohol/week: 12.0 standard drinks of alcohol     Types: 12 Cans of beer per week    Drug use: Not Currently    Sexual activity: Not on file   Other Topics Concern    Not on file   Social History Narrative    Not on file     Social Determinants of Health     Financial Resource Strain: Not on file   Food Insecurity: Not on file   Transportation Needs: Not on file   Physical Activity: Not on file   Stress: Not on file   Social Connections: Not on file   Intimate Partner Violence: Not on file   Housing Stability: Not on file      Patient has no known allergies.   Current Outpatient Medications   Medication Sig Dispense Refill    apixaban (Eliquis DVT-PE Treat 30D Start) 5 mg (74 tabs) tablet Take 2 tablets (10 mg) by mouth 2 times a day for 7 days, then take 1 tablet (5 mg) by mouth 2 times a day. (Patient taking differently: Take 1 tablet (5 mg) by mouth 2 times a day. Take 2 tablets (10 mg) by mouth 2 times a day for 7 days, then take 1 tablet (5 mg) by mouth 2 times a day.) 74 tablet 0    clindamycin (Cleocin T) 1 % external solution 1 Application.      clindamycin (Cleocin T) 1 % lotion Clindamycin Phosphate 1 % External Lotion    Refills: 0       Active      diazePAM (Valium) 5 mg tablet Take 1 tablet (5 mg) by mouth once daily as needed.      esomeprazole (NexIUM) 20 mg DR capsule Take 1 capsule (20 mg) by mouth once daily.      levothyroxine (Synthroid, Levoxyl) 125 mcg tablet TAKE 1 TABLET BY MOUTH EVERY DAY 90 tablet 1    lisinopril 5 mg tablet Take 1 tablet (5 mg) by mouth once daily.      magnesium oxide (Mag-Ox) 400 mg (241.3 mg magnesium) tablet TAKE 1 TABLET BY MOUTH TWICE A  tablet 0    metoprolol succinate XL (Toprol-XL) 100 mg 24 hr tablet Take 1 tablet (100 mg) by mouth once daily.      multivitamin tablet Take 1 tablet by mouth once daily.      aspirin 81 mg chewable tablet Chew 1 tablet (81 mg) once daily. resume after you complete course of Aspirin 81mg Bid X 30 days, to help reduce your risk for blood clots      polyethylene glycol (Miralax) 17 gram/dose powder Take 17 g by mouth once daily as needed.       No current facility-administered medications for this visit.       Immunization History   Administered Date(s) Administered    Flu vaccine (IIV4), preservative free *Check age/dose* 09/30/2018, 10/01/2019, 10/07/2022    Influenza, High Dose Seasonal, Preservative Free 10/19/2021    Influenza, Unspecified 10/29/2009, 10/25/2011, 10/01/2014, 10/01/2015, 10/01/2016, 10/01/2021    Influenza, seasonal, injectable 10/01/2020, 10/01/2023    Moderna SARS-CoV-2 Vaccination 12/31/2020, 02/11/2021, 02/28/2021, 11/19/2021    Novel influenza-H1N1-09, preservative-free 11/05/2009, 10/07/2022    Pfizer Purple Cap SARS-CoV-2 09/20/2022    Pneumococcal Conjugate PCV 7 1954    Pneumococcal conjugate vaccine, 13-valent (PREVNAR 13) 10/19/2021    Pneumococcal polysaccharide vaccine, 23-valent, age 2 years and older (PNEUMOVAX 23) 10/27/2022        Review of Systems   Constitutional: Negative.    HENT: Negative.     Eyes: Negative.    Respiratory: Negative.     Cardiovascular: Negative.    Gastrointestinal: Negative.     Endocrine: Negative.    Genitourinary: Negative.    Musculoskeletal: Negative.    Skin: Negative.    Allergic/Immunologic: Negative.    Hematological: Negative.    Psychiatric/Behavioral: Negative.     All other systems reviewed and are negative.       Vitals:    01/25/24 1510   BP: 99/69   Pulse: 73   Temp: 36.1 °C (97 °F)     Vitals:    01/25/24 1510   Weight: 127 kg (279 lb)      Physical Exam  Constitutional:       General: He is not in acute distress.     Appearance: Normal appearance. He is not ill-appearing.   Cardiovascular:      Rate and Rhythm: Normal rate and regular rhythm.      Pulses: Normal pulses.      Heart sounds: Normal heart sounds. No murmur heard.     No friction rub. No gallop.   Pulmonary:      Effort: Pulmonary effort is normal.      Breath sounds: Normal breath sounds. No wheezing or rales.   Neurological:      Mental Status: He is alert.     Chest exam is overall clear but slight decreased breath sounds in both lower lung fields.  Legs-there is no calf tenderness swelling or induration.  Both calves are soft.  There is mild lower leg edema above the ankles bilaterally.        ASSESSMENT/PLAN: Incidental right subsegmental upper lobe pulmonary embolus  Stable ascending and aortic root aneurysms.  Hypertension stable    Plan-continue current meds including Eliquis 5 mg twice daily.   Schedule for bilateral venous duplex scans of legs.  Call for any chest pain shortness of breath or cough.  We discussed potential side effects of anticoagulation.  Continue other meds as noted  Consider hematology consultation to evaluate for underlying clotting disorder  Follow-up in 2 weeks and call as needed

## 2024-01-31 ENCOUNTER — HOSPITAL ENCOUNTER (OUTPATIENT)
Dept: CARDIOLOGY | Facility: HOSPITAL | Age: 69
Discharge: HOME | End: 2024-01-31
Payer: COMMERCIAL

## 2024-01-31 DIAGNOSIS — I27.82 OTHER CHRONIC PULMONARY EMBOLISM, UNSPECIFIED WHETHER ACUTE COR PULMONALE PRESENT (MULTI): ICD-10-CM

## 2024-01-31 DIAGNOSIS — I26.99 OTHER PULMONARY EMBOLISM WITHOUT ACUTE COR PULMONALE (MULTI): ICD-10-CM

## 2024-01-31 PROCEDURE — 93970 EXTREMITY STUDY: CPT

## 2024-01-31 PROCEDURE — 93970 EXTREMITY STUDY: CPT | Performed by: INTERNAL MEDICINE

## 2024-02-02 DIAGNOSIS — I26.93 SINGLE SUBSEGMENTAL PULMONARY EMBOLISM WITHOUT ACUTE COR PULMONALE (MULTI): ICD-10-CM

## 2024-02-02 RX ORDER — APIXABAN 5 MG (74)
KIT ORAL
Qty: 74 TABLET | Refills: 0 | Status: SHIPPED | OUTPATIENT
Start: 2024-02-02 | End: 2024-02-05 | Stop reason: ALTCHOICE

## 2024-02-05 DIAGNOSIS — R00.2 PALPITATIONS: Primary | ICD-10-CM

## 2024-02-05 DIAGNOSIS — I47.29 OTHER VENTRICULAR TACHYCARDIA (MULTI): ICD-10-CM

## 2024-02-05 DIAGNOSIS — I26.93 SINGLE SUBSEGMENTAL PULMONARY EMBOLISM WITHOUT ACUTE COR PULMONALE (MULTI): Primary | ICD-10-CM

## 2024-02-06 RX ORDER — APIXABAN 5 MG (74)
KIT ORAL
Qty: 74 TABLET | OUTPATIENT
Start: 2024-02-06

## 2024-02-06 RX ORDER — NAPROXEN SODIUM 220 MG/1
81 TABLET, FILM COATED ORAL DAILY
Qty: 90 TABLET | Refills: 3 | OUTPATIENT
Start: 2024-02-06

## 2024-02-06 RX ORDER — METOPROLOL SUCCINATE 100 MG/1
100 TABLET, EXTENDED RELEASE ORAL DAILY
Qty: 90 TABLET | Refills: 3 | Status: SHIPPED | OUTPATIENT
Start: 2024-02-06

## 2024-02-08 ENCOUNTER — OFFICE VISIT (OUTPATIENT)
Dept: PRIMARY CARE | Facility: CLINIC | Age: 69
End: 2024-02-08
Payer: COMMERCIAL

## 2024-02-08 VITALS
SYSTOLIC BLOOD PRESSURE: 109 MMHG | DIASTOLIC BLOOD PRESSURE: 63 MMHG | TEMPERATURE: 97.3 F | BODY MASS INDEX: 35.94 KG/M2 | HEIGHT: 74 IN | WEIGHT: 280 LBS | HEART RATE: 62 BPM

## 2024-02-08 DIAGNOSIS — Z12.5 PROSTATE CANCER SCREENING: ICD-10-CM

## 2024-02-08 DIAGNOSIS — E66.09 CLASS 2 OBESITY DUE TO EXCESS CALORIES WITH BODY MASS INDEX (BMI) OF 35.0 TO 35.9 IN ADULT, UNSPECIFIED WHETHER SERIOUS COMORBIDITY PRESENT: ICD-10-CM

## 2024-02-08 DIAGNOSIS — I10 HYPERTENSION, ESSENTIAL, BENIGN: ICD-10-CM

## 2024-02-08 DIAGNOSIS — I27.82 OTHER CHRONIC PULMONARY EMBOLISM, UNSPECIFIED WHETHER ACUTE COR PULMONALE PRESENT (MULTI): ICD-10-CM

## 2024-02-08 DIAGNOSIS — E03.9 ACQUIRED HYPOTHYROIDISM: Primary | ICD-10-CM

## 2024-02-08 DIAGNOSIS — Z87.891 FORMER SMOKER: ICD-10-CM

## 2024-02-08 PROCEDURE — 1036F TOBACCO NON-USER: CPT | Performed by: FAMILY MEDICINE

## 2024-02-08 PROCEDURE — 1159F MED LIST DOCD IN RCRD: CPT | Performed by: FAMILY MEDICINE

## 2024-02-08 PROCEDURE — 3008F BODY MASS INDEX DOCD: CPT | Performed by: FAMILY MEDICINE

## 2024-02-08 PROCEDURE — 3074F SYST BP LT 130 MM HG: CPT | Performed by: FAMILY MEDICINE

## 2024-02-08 PROCEDURE — 3078F DIAST BP <80 MM HG: CPT | Performed by: FAMILY MEDICINE

## 2024-02-08 PROCEDURE — 1160F RVW MEDS BY RX/DR IN RCRD: CPT | Performed by: FAMILY MEDICINE

## 2024-02-08 PROCEDURE — 99213 OFFICE O/P EST LOW 20 MIN: CPT | Performed by: FAMILY MEDICINE

## 2024-02-08 PROCEDURE — 1157F ADVNC CARE PLAN IN RCRD: CPT | Performed by: FAMILY MEDICINE

## 2024-02-08 NOTE — PROGRESS NOTES
Subjective     Patient ID: Julio White is a 68 y.o. male who presents for Follow-up:    Problem List Items Addressed This Visit    None     Past Medical History:   Diagnosis Date    Body mass index (BMI) 37.0-37.9, adult 2021    BMI 37.0-37.9, adult    Obesity, unspecified 08/10/2022    Class 2 obesity with body mass index (BMI) of 38.0 to 38.9 in adult    Other conditions influencing health status 2021    Patient new to provider    Personal history of other diseases of the circulatory system 2022    History of blood pressure problems    Personal history of other diseases of the respiratory system 10/19/2022    History of acute sinusitis    Personal history of other endocrine, nutritional and metabolic disease 2021    History of obesity      Past Surgical History:   Procedure Laterality Date    OTHER SURGICAL HISTORY  10/05/2021    Hip surgery    OTHER SURGICAL HISTORY  10/05/2021    Thyroid surgery    OTHER SURGICAL HISTORY  2022    Colonoscopy    TOTAL KNEE ARTHROPLASTY Right 2022      Family History   Problem Relation Name Age of Onset    Cancer Mother      Other (cardiac disorder) Father        Social History     Socioeconomic History    Marital status:      Spouse name: Not on file    Number of children: Not on file    Years of education: Not on file    Highest education level: Not on file   Occupational History    Not on file   Tobacco Use    Smoking status: Former     Types: Cigarettes     Quit date:      Years since quittin.1    Smokeless tobacco: Never   Substance and Sexual Activity    Alcohol use: Yes     Alcohol/week: 12.0 standard drinks of alcohol     Types: 12 Cans of beer per week    Drug use: Not Currently    Sexual activity: Not on file   Other Topics Concern    Not on file   Social History Narrative    Not on file     Social Determinants of Health     Financial Resource Strain: Not on file   Food Insecurity: Not on file   Transportation Needs:  Not on file   Physical Activity: Not on file   Stress: Not on file   Social Connections: Not on file   Intimate Partner Violence: Not on file   Housing Stability: Not on file      Patient has no known allergies.   Current Outpatient Medications   Medication Sig Dispense Refill    apixaban (Eliquis) 5 mg tablet Take 1 tablet (5 mg) by mouth 2 times a day. 180 tablet 0    aspirin 81 mg chewable tablet Chew 1 tablet (81 mg) once daily. resume after you complete course of Aspirin 81mg Bid X 30 days, to help reduce your risk for blood clots      clindamycin (Cleocin T) 1 % external solution 1 Application.      clindamycin (Cleocin T) 1 % lotion Clindamycin Phosphate 1 % External Lotion   Refills: 0       Active      diazePAM (Valium) 5 mg tablet Take 1 tablet (5 mg) by mouth once daily as needed.      esomeprazole (NexIUM) 20 mg DR capsule Take 1 capsule (20 mg) by mouth once daily.      levothyroxine (Synthroid, Levoxyl) 125 mcg tablet TAKE 1 TABLET BY MOUTH EVERY DAY 90 tablet 1    lisinopril 5 mg tablet TAKE 1 TABLET BY MOUTH EVERY DAY 90 tablet 3    magnesium oxide (Mag-Ox) 400 mg (241.3 mg magnesium) tablet TAKE 1 TABLET BY MOUTH TWICE A  tablet 0    metoprolol succinate XL (Toprol-XL) 100 mg 24 hr tablet TAKE 1 TABLET BY MOUTH EVERY DAY 90 tablet 3    multivitamin tablet Take 1 tablet by mouth once daily.      polyethylene glycol (Miralax) 17 gram/dose powder Take 17 g by mouth once daily as needed.       No current facility-administered medications for this visit.       Immunization History   Administered Date(s) Administered    Flu vaccine (IIV4), preservative free *Check age/dose* 09/30/2018, 10/01/2019, 10/07/2022    Influenza, High Dose Seasonal, Preservative Free 10/19/2021    Influenza, Unspecified 10/29/2009, 10/25/2011, 10/01/2014, 10/01/2015, 10/01/2016, 10/01/2021    Influenza, seasonal, injectable 10/01/2020, 10/01/2023    Moderna SARS-CoV-2 Vaccination 12/31/2020, 02/11/2021, 02/28/2021,  11/19/2021    Novel influenza-H1N1-09, preservative-free 11/05/2009, 10/07/2022    Pfizer Purple Cap SARS-CoV-2 09/20/2022    Pneumococcal Conjugate PCV 7 1954    Pneumococcal conjugate vaccine, 13-valent (PREVNAR 13) 10/19/2021    Pneumococcal polysaccharide vaccine, 23-valent, age 2 years and older (PNEUMOVAX 23) 10/27/2022        Review of Systems     Vitals:    02/08/24 1441   BP: 109/63   Pulse: 62   Temp: 36.3 °C (97.3 °F)     Vitals:    02/08/24 1441   Weight: 127 kg (280 lb)      Physical Exam     ASSESSMENT/PLAN:         Scribe Attestation  By signing my name below, I, Salma Camara LPN, Scribe   attest that this documentation has been prepared under the direction and in the presence of Jim Keller MD.

## 2024-02-09 RX ORDER — APIXABAN 5 MG (74)
KIT ORAL
Refills: 0 | OUTPATIENT
Start: 2024-02-09

## 2024-02-10 ASSESSMENT — ENCOUNTER SYMPTOMS
CONSTITUTIONAL NEGATIVE: 1
PSYCHIATRIC NEGATIVE: 1
CARDIOVASCULAR NEGATIVE: 1
GASTROINTESTINAL NEGATIVE: 1
ALLERGIC/IMMUNOLOGIC NEGATIVE: 1
MUSCULOSKELETAL NEGATIVE: 1
RESPIRATORY NEGATIVE: 1
HEMATOLOGIC/LYMPHATIC NEGATIVE: 1
EYES NEGATIVE: 1
ENDOCRINE NEGATIVE: 1

## 2024-02-10 NOTE — PROGRESS NOTES
Isrrael Ba is here today for a follow-up on his pulmonary embolus and venous duplex ultrasound of his legs.  He states that he is feeling well and has no complaints.  He denies any chest pain or significant shortness of breath.  He has had no significant leg swelling.  He denies any leg pain.  His duplex scan of the veins did show chronic changes in the right mid femoral and popliteal veins.  There were no acute DVT on either side.  He continues on his meds noted.    Patient ID: Julio White is a 68 y.o. male who presents for Follow-up:    Problem List Items Addressed This Visit       Hypertension, essential, benign    Relevant Orders    CBC    Comprehensive Metabolic Panel    Lipid Panel    Class 2 obesity with body mass index (BMI) of 35.0 to 35.9 in adult    Relevant Orders    CBC    Comprehensive Metabolic Panel    Lipid Panel    Former smoker     Other Visit Diagnoses       Other chronic pulmonary embolism, unspecified whether acute cor pulmonale present (CMS/[facility])        Relevant Orders    CBC    Comprehensive Metabolic Panel    Lipid Panel    Prostate cancer screening        Relevant Orders    Prostate Specific Antigen, Screen           Past Medical History:   Diagnosis Date    Body mass index (BMI) 37.0-37.9, adult 11/01/2021    BMI 37.0-37.9, adult    Obesity, unspecified 08/10/2022    Class 2 obesity with body mass index (BMI) of 38.0 to 38.9 in adult    Other conditions influencing health status 11/01/2021    Patient new to provider    Personal history of other diseases of the circulatory system 08/04/2022    History of blood pressure problems    Personal history of other diseases of the respiratory system 10/19/2022    History of acute sinusitis    Personal history of other endocrine, nutritional and metabolic disease 11/01/2021    History of obesity      Past Surgical History:   Procedure Laterality Date    OTHER SURGICAL HISTORY  10/05/2021    Hip surgery    OTHER SURGICAL HISTORY  10/05/2021     Thyroid surgery    OTHER SURGICAL HISTORY  2022    Colonoscopy    TOTAL KNEE ARTHROPLASTY Right 2022      Family History   Problem Relation Name Age of Onset    Cancer Mother      Other (cardiac disorder) Father        Social History     Socioeconomic History    Marital status:      Spouse name: Not on file    Number of children: Not on file    Years of education: Not on file    Highest education level: Not on file   Occupational History    Not on file   Tobacco Use    Smoking status: Former     Types: Cigarettes     Quit date:      Years since quittin.1    Smokeless tobacco: Never   Substance and Sexual Activity    Alcohol use: Yes     Alcohol/week: 12.0 standard drinks of alcohol     Types: 12 Cans of beer per week    Drug use: Not Currently    Sexual activity: Not on file   Other Topics Concern    Not on file   Social History Narrative    Not on file     Social Determinants of Health     Financial Resource Strain: Not on file   Food Insecurity: Not on file   Transportation Needs: Not on file   Physical Activity: Not on file   Stress: Not on file   Social Connections: Not on file   Intimate Partner Violence: Not on file   Housing Stability: Not on file      Patient has no known allergies.   Current Outpatient Medications   Medication Sig Dispense Refill    apixaban (Eliquis) 5 mg tablet Take 1 tablet (5 mg) by mouth 2 times a day. 180 tablet 0    aspirin 81 mg chewable tablet Chew 1 tablet (81 mg) once daily. resume after you complete course of Aspirin 81mg Bid X 30 days, to help reduce your risk for blood clots      clindamycin (Cleocin T) 1 % external solution 1 Application.      clindamycin (Cleocin T) 1 % lotion Clindamycin Phosphate 1 % External Lotion   Refills: 0       Active      diazePAM (Valium) 5 mg tablet Take 1 tablet (5 mg) by mouth once daily as needed.      levothyroxine (Synthroid, Levoxyl) 125 mcg tablet TAKE 1 TABLET BY MOUTH EVERY DAY 90 tablet 1    lisinopril 5 mg  tablet TAKE 1 TABLET BY MOUTH EVERY DAY 90 tablet 3    magnesium oxide (Mag-Ox) 400 mg (241.3 mg magnesium) tablet TAKE 1 TABLET BY MOUTH TWICE A  tablet 0    metoprolol succinate XL (Toprol-XL) 100 mg 24 hr tablet TAKE 1 TABLET BY MOUTH EVERY DAY 90 tablet 3    multivitamin tablet Take 1 tablet by mouth once daily.      polyethylene glycol (Miralax) 17 gram/dose powder Take 17 g by mouth once daily as needed.      esomeprazole (NexIUM) 20 mg DR capsule TAKE 1 CAPSULE BY MOUTH EVERY DAY 90 capsule 1     No current facility-administered medications for this visit.       Immunization History   Administered Date(s) Administered    Flu vaccine (IIV4), preservative free *Check age/dose* 09/30/2018, 10/01/2019, 10/07/2022    Influenza, High Dose Seasonal, Preservative Free 10/19/2021    Influenza, Unspecified 10/29/2009, 10/25/2011, 10/01/2014, 10/01/2015, 10/01/2016, 10/01/2021    Influenza, seasonal, injectable 10/01/2020, 10/01/2023    Moderna SARS-CoV-2 Vaccination 12/31/2020, 02/11/2021, 02/28/2021, 11/19/2021    Novel influenza-H1N1-09, preservative-free 11/05/2009, 10/07/2022    Pfizer Purple Cap SARS-CoV-2 09/20/2022    Pneumococcal Conjugate PCV 7 1954    Pneumococcal conjugate vaccine, 13-valent (PREVNAR 13) 10/19/2021    Pneumococcal polysaccharide vaccine, 23-valent, age 2 years and older (PNEUMOVAX 23) 10/27/2022        Review of Systems   Constitutional: Negative.    HENT: Negative.     Eyes: Negative.    Respiratory: Negative.     Cardiovascular: Negative.    Gastrointestinal: Negative.    Endocrine: Negative.    Genitourinary: Negative.    Musculoskeletal: Negative.    Skin: Negative.    Allergic/Immunologic: Negative.    Hematological: Negative.    Psychiatric/Behavioral: Negative.     All other systems reviewed and are negative.       Vitals:    02/08/24 1441   BP: 109/63   Pulse: 62   Temp: 36.3 °C (97.3 °F)     Vitals:    02/08/24 1441   Weight: 127 kg (280 lb)      Physical  Exam  Constitutional:       General: He is not in acute distress.     Appearance: Normal appearance.   Cardiovascular:      Rate and Rhythm: Normal rate and regular rhythm.      Pulses: Normal pulses.      Heart sounds: Normal heart sounds. No murmur heard.     No friction rub. No gallop.   Pulmonary:      Effort: Pulmonary effort is normal.      Breath sounds: Normal breath sounds. No wheezing or rales.   Neurological:      Mental Status: He is alert.          ASSESSMENT/PLAN: Right subsegmental pulmonary embolus  Chronic venous changes right femoral/popliteal veins    Plan-continue current meds noted.  We discussed using Eliquis for about 4 to 6 months.  Follow-up with cardiology as recommended  Exercise regularly  Check CBC CMP lipid profile and PSA.  Also check TSH and magnesium levels.  Follow-up in 3 months and call as needed

## 2024-02-12 ENCOUNTER — OFFICE VISIT (OUTPATIENT)
Dept: CARDIOLOGY | Facility: CLINIC | Age: 69
End: 2024-02-12
Payer: COMMERCIAL

## 2024-02-12 VITALS
HEIGHT: 74 IN | HEART RATE: 71 BPM | BODY MASS INDEX: 35.94 KG/M2 | DIASTOLIC BLOOD PRESSURE: 80 MMHG | SYSTOLIC BLOOD PRESSURE: 122 MMHG | WEIGHT: 280 LBS

## 2024-02-12 DIAGNOSIS — D68.59 HYPERCOAGULABLE STATE (MULTI): ICD-10-CM

## 2024-02-12 DIAGNOSIS — Z87.891 FORMER SMOKER: ICD-10-CM

## 2024-02-12 DIAGNOSIS — I71.21 AORTIC ROOT ANEURYSM (CMS-HCC): ICD-10-CM

## 2024-02-12 DIAGNOSIS — I47.29 NSVT (NONSUSTAINED VENTRICULAR TACHYCARDIA) (MULTI): ICD-10-CM

## 2024-02-12 DIAGNOSIS — I49.3 PVC (PREMATURE VENTRICULAR CONTRACTION): ICD-10-CM

## 2024-02-12 DIAGNOSIS — I10 HYPERTENSION, ESSENTIAL, BENIGN: ICD-10-CM

## 2024-02-12 DIAGNOSIS — E66.01 CLASS 2 SEVERE OBESITY DUE TO EXCESS CALORIES WITH SERIOUS COMORBIDITY AND BODY MASS INDEX (BMI) OF 35.0 TO 35.9 IN ADULT (MULTI): ICD-10-CM

## 2024-02-12 PROCEDURE — 1160F RVW MEDS BY RX/DR IN RCRD: CPT | Performed by: INTERNAL MEDICINE

## 2024-02-12 PROCEDURE — 1157F ADVNC CARE PLAN IN RCRD: CPT | Performed by: INTERNAL MEDICINE

## 2024-02-12 PROCEDURE — 1159F MED LIST DOCD IN RCRD: CPT | Performed by: INTERNAL MEDICINE

## 2024-02-12 PROCEDURE — 1036F TOBACCO NON-USER: CPT | Performed by: INTERNAL MEDICINE

## 2024-02-12 PROCEDURE — 3008F BODY MASS INDEX DOCD: CPT | Performed by: INTERNAL MEDICINE

## 2024-02-12 PROCEDURE — 3079F DIAST BP 80-89 MM HG: CPT | Performed by: INTERNAL MEDICINE

## 2024-02-12 PROCEDURE — 3074F SYST BP LT 130 MM HG: CPT | Performed by: INTERNAL MEDICINE

## 2024-02-12 PROCEDURE — 99215 OFFICE O/P EST HI 40 MIN: CPT | Performed by: INTERNAL MEDICINE

## 2024-02-12 ASSESSMENT — ENCOUNTER SYMPTOMS
CONSTITUTIONAL NEGATIVE: 1
CARDIOVASCULAR NEGATIVE: 1
NEUROLOGICAL NEGATIVE: 1
RESPIRATORY NEGATIVE: 1

## 2024-02-12 NOTE — PROGRESS NOTES
"Chief Complaint:   Follow-up     History Of Present Illness:    Julio White is a 68 y.o. male presenting with follow-up.  He is companied by his wife.    He has no arrhythmia symptoms.  He denies any palpitation, lightheadedness, near-syncope, or syncope.    He had an incidental finding of pulmonary embolus on CT scan when he had aortic screening.  He also has prior DVT (after right knee replacement) and possible evidence of chronic thrombosis  of right lower extremity.  Patient takes Eliquis now.   I discussed this with the patient and his wife in detail.  I also reviewed this with Dr. Vergara.  Will refer to hematology for evaluation for possible hypercoagulable state.    Last Recorded Vitals:  Vitals:    02/12/24 1621   BP: 122/80   BP Location: Left arm   Patient Position: Sitting   Pulse: 71   Weight: 127 kg (280 lb)   Height: 1.88 m (6' 2\")       Past Medical History:  See list    Past Surgical History:  See list    Social History:  He reports that he quit smoking about 2 years ago. His smoking use included cigarettes. He has never used smokeless tobacco. He reports current alcohol use of about 12.0 standard drinks of alcohol per week. He reports that he does not currently use drugs.  He walks about 15,000 steps daily  He swims for an hour 4 times per week    Family History:  Family History   Problem Relation Name Age of Onset    Cancer Mother      Other (cardiac disorder) Father          Allergies:  Patient has no known allergies.    Outpatient Medications:  Current Outpatient Medications   Medication Instructions    apixaban (ELIQUIS) 5 mg, oral, 2 times daily    aspirin 81 mg chewable tablet 1 tablet, oral, Daily, resume after you complete course of Aspirin 81mg Bid X 30 days, to help reduce your risk for blood clots    clindamycin (Cleocin T) 1 % external solution 1 Application    clindamycin (Cleocin T) 1 % lotion Clindamycin Phosphate 1 % External Lotion   Refills: 0       Active    diazePAM " (Valium) 5 mg tablet 1 tablet, oral, Daily PRN    esomeprazole (NEXIUM) 20 mg, oral, Daily    levothyroxine (SYNTHROID, LEVOXYL) 125 mcg, oral, Daily    lisinopril 5 mg, oral, Daily    magnesium oxide (MAG-OX) 400 mg, oral, 2 times daily    metoprolol succinate XL (TOPROL-XL) 100 mg, oral, Daily    multivitamin tablet 1 tablet, oral, Daily    polyethylene glycol (MIRALAX) 17 g, oral, Daily PRN   Review of Systems   Constitutional: Negative.   Cardiovascular: Negative.    Respiratory: Negative.     Neurological: Negative.    All other systems reviewed and are negative.        Physical Exam:  Physical Exam  Cardiovascular:      Rate and Rhythm: Normal rate.      Pulses: Normal pulses.      Heart sounds: Normal heart sounds.   Pulmonary:      Effort: Pulmonary effort is normal.   Neurological:      General: No focal deficit present.      Mental Status: He is alert.            Last Labs:  CBC -  Lab Results   Component Value Date    WBC 8.7 05/15/2023    HGB 15.0 05/15/2023    HCT 46.4 05/15/2023    MCV 94 05/15/2023     05/15/2023       CMP -  Lab Results   Component Value Date    CALCIUM 8.8 01/04/2024    PHOS 3.1 08/27/2020    PROT 6.6 01/04/2024    ALBUMIN 4.2 01/04/2024    AST 16 01/04/2024    ALT 23 01/04/2024    ALKPHOS 76 01/04/2024    BILITOT 0.5 01/04/2024       LIPID PANEL -   Lab Results   Component Value Date    CHOL 175 05/15/2023    TRIG 90 05/15/2023    HDL 55.5 05/15/2023    CHHDL 3.2 05/15/2023    LDLF 102 (H) 05/15/2023    VLDL 18 05/15/2023       RENAL FUNCTION PANEL -   Lab Results   Component Value Date    GLUCOSE 97 01/04/2024     01/04/2024    K 4.4 01/04/2024     01/04/2024    CO2 27 01/04/2024    ANIONGAP 12 01/04/2024    BUN 16 01/04/2024    CREATININE 0.99 01/04/2024    GFRMALE >90 05/15/2023    CALCIUM 8.8 01/04/2024    PHOS 3.1 08/27/2020    ALBUMIN 4.2 01/04/2024        Lab Results   Component Value Date    HGBA1C 5.6 01/04/2024       Last Cardiology Tests:  ECG:  ECG 12  lead (Clinic Performed) 10/30/2023  Normal sinus rhythm.  First-degree AV block.  Normal axis.  Corrected QT interval 420 ms.  PVC    Vascular right lower extremity ultrasound January 2024.  Chronic changes in the mid femoral and popliteal veins    CT January 2024.  Stable dilation of aortic root up to 4.3 cm sinus of Valsalva.  Subsequent multiple right upper lobe pulmonary embolism.  Mild coronary calcification.  Normal atrial size.  Adrenal nodule.      Holter monitor in April 2021 showed over 20% PVCs. Brief NSVT up to 5 beats noted. No AVNRT noted    PSVT. 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 LVEF 55%. mild LVH. mild LA dilation  Echocardiogram with normal ejection fraction 60% in 2015.      Cardiac Imaging:  MR CARDIAC RESONANCE IMAGING FOR VELOCITY FLOW MAPPING 12/21/2021        Lab review: I have personally reviewed the laboratory result(s) see above    Assessment/Plan   Problem List Items Addressed This Visit       PVC (premature ventricular contraction)    NSVT (nonsustained ventricular tachycardia) (CMS/HCC)    Hypertension, essential, benign    Class 2 obesity with body mass index (BMI) of 35.0 to 35.9 in adult    Former smoker    Aortic root aneurysm (CMS/HCC)         Frequent PVCs and nonsustained ventricular tachycardia with unremarkable CT and catheterization 2021.  Asymptomatic.  Prior history of nonischemic cardiomyopathy and ejection fraction 60% by echocardiogram 2015 and 55% by echo 2021.  Reviewed meds.  Continue Mg Ox BID.  Refills discussed.  PSVT. Status post ablation of slow pathway of the AV node in 2015. No recurrence of tachycardia clinically.  Brief paroxysmal atrial tachycardia (likely) by Holter monitor 2021. Not clinically significant. Would continue empirical metoprolol and magnesium.  Discussed refills.  Hypertension. Chronic. Stable. Benign. Controlled.  Refills discussed.  Thoracic aneurysm without dissection. CT angiogram  stable. Follows with cardiology  Possible hypercoagulable state.  Chronic changes on lower extremity vascular ultrasound, although patient had remote knee replacement, and had incidental subsegmental pulmonary embolism.  Currently anticoagulated.  Will refer to hematology for evaluation.  Tobacco use. Counseled over 3 minutes regarding tobacco cessation and remaining smoke-free as he is a former tobacco user.  Behavior modification to decrease caffeine intake from 4 cups daily to 1 cup daily. Reinforced caffeine decrease.  Alcohol intake. Discussed alcohol modification   Overweight  COVID-19 recovered  Orthostatic hypotension. Behavioral modification reenforced.  s/p R TKR.    AHA recommendations for exercise, diet, and behavioral modification reviewed with pt.     The patient, wife, and I discussed the mechanism of arrhythmia, types of magnesium, last ECG, PVCs, ventricular tachycardia, prior ablation with no recurrence of AV ras reentry tachycardia or SVT, blood pressure, anticoagulation, CT scan, pulmonary embolism, history of DVT and chronic vascular changes by ultrasound, referral to hematology,, indications for and types of medications, discussion if and what medication refills needed, treatment options, risks, benefits, and imponderables. American Heart Association lifestyle changes and behavioral modification discussed. All questions answered in detail. Counseling over 50% visit regarding above. Patient and family appreciative of care.     Aruna Mireles MD

## 2024-02-16 DIAGNOSIS — E03.9 HYPOTHYROIDISM, UNSPECIFIED: ICD-10-CM

## 2024-02-17 RX ORDER — LEVOTHYROXINE SODIUM 125 UG/1
125 TABLET ORAL DAILY
Refills: 0 | OUTPATIENT
Start: 2024-02-17

## 2024-02-27 ENCOUNTER — OFFICE VISIT (OUTPATIENT)
Dept: HEMATOLOGY/ONCOLOGY | Facility: CLINIC | Age: 69
End: 2024-02-27
Payer: COMMERCIAL

## 2024-02-27 VITALS
SYSTOLIC BLOOD PRESSURE: 126 MMHG | HEART RATE: 91 BPM | OXYGEN SATURATION: 94 % | TEMPERATURE: 97 F | DIASTOLIC BLOOD PRESSURE: 87 MMHG | BODY MASS INDEX: 38.8 KG/M2 | WEIGHT: 277.12 LBS | HEIGHT: 71 IN | RESPIRATION RATE: 18 BRPM

## 2024-02-27 DIAGNOSIS — E06.3 HYPOTHYROIDISM DUE TO HASHIMOTO'S THYROIDITIS: ICD-10-CM

## 2024-02-27 DIAGNOSIS — I49.3 PVC (PREMATURE VENTRICULAR CONTRACTION): ICD-10-CM

## 2024-02-27 DIAGNOSIS — D68.59 HYPERCOAGULABLE STATE (MULTI): ICD-10-CM

## 2024-02-27 DIAGNOSIS — E03.8 HYPOTHYROIDISM DUE TO HASHIMOTO'S THYROIDITIS: ICD-10-CM

## 2024-02-27 DIAGNOSIS — I10 HYPERTENSION, ESSENTIAL, BENIGN: ICD-10-CM

## 2024-02-27 DIAGNOSIS — I26.93 SINGLE SUBSEGMENTAL PULMONARY EMBOLISM WITHOUT ACUTE COR PULMONALE (MULTI): Primary | ICD-10-CM

## 2024-02-27 DIAGNOSIS — I71.21 AORTIC ROOT ANEURYSM (CMS-HCC): ICD-10-CM

## 2024-02-27 PROCEDURE — 99204 OFFICE O/P NEW MOD 45 MIN: CPT | Performed by: INTERNAL MEDICINE

## 2024-02-27 PROCEDURE — 3079F DIAST BP 80-89 MM HG: CPT | Performed by: INTERNAL MEDICINE

## 2024-02-27 PROCEDURE — 1036F TOBACCO NON-USER: CPT | Performed by: INTERNAL MEDICINE

## 2024-02-27 PROCEDURE — 1160F RVW MEDS BY RX/DR IN RCRD: CPT | Performed by: INTERNAL MEDICINE

## 2024-02-27 PROCEDURE — 3008F BODY MASS INDEX DOCD: CPT | Performed by: INTERNAL MEDICINE

## 2024-02-27 PROCEDURE — 1159F MED LIST DOCD IN RCRD: CPT | Performed by: INTERNAL MEDICINE

## 2024-02-27 PROCEDURE — 99214 OFFICE O/P EST MOD 30 MIN: CPT | Performed by: INTERNAL MEDICINE

## 2024-02-27 PROCEDURE — 3074F SYST BP LT 130 MM HG: CPT | Performed by: INTERNAL MEDICINE

## 2024-02-27 PROCEDURE — 1157F ADVNC CARE PLAN IN RCRD: CPT | Performed by: INTERNAL MEDICINE

## 2024-02-27 PROCEDURE — 1126F AMNT PAIN NOTED NONE PRSNT: CPT | Performed by: INTERNAL MEDICINE

## 2024-02-27 ASSESSMENT — PAIN SCALES - GENERAL: PAINLEVEL: 0-NO PAIN

## 2024-02-27 NOTE — PROGRESS NOTES
"Patient ID: Julio White is a 68 y.o. male.  Referring Physician: Aruna Mireles MD  [address] [place] [address] [place] [address]  Primary Care Provider: Jim Keller MD  Visit Type: Initial Visit      Subjective    HPI I had blood clots in my lungs.  -He had a CT angio of his chest done on 1/12/2024 primarily for cardiac reason, but there was incidental finding of PE  He had a doppler of his legs done on 1/31/2024  -He had a presumed DVT in his right leg 20 years ago after orthopedic surgery    Review of Systems   Constitutional: Negative.    HENT:  Negative.     Eyes: Negative.    Respiratory: Negative.     Cardiovascular: Negative.    Gastrointestinal: Negative.    Endocrine: Negative.    Genitourinary: Negative.     Musculoskeletal: Negative.    Skin: Negative.    Neurological: Negative.    Hematological: Negative.    Psychiatric/Behavioral: Negative.          Objective   BSA: 2.51 meters squared  /87 (BP Location: Right arm)   Pulse 91   Temp 36.1 °C (97 °F) (Temporal)   Resp 18   Ht (S) 1.796 m (5' 10.71\")   Wt 126 kg (277 lb 1.9 oz)   SpO2 94%   BMI 38.97 kg/m²      has a past medical history of Body mass index (BMI) 37.0-37.9, adult (11/01/2021), Obesity, unspecified (08/10/2022), Other conditions influencing health status (11/01/2021), Personal history of other diseases of the circulatory system (08/04/2022), Personal history of other diseases of the respiratory system (10/19/2022), and Personal history of other endocrine, nutritional and metabolic disease (11/01/2021).   has a past surgical history that includes Other surgical history (10/05/2021); Other surgical history (10/05/2021); Other surgical history (09/26/2022); and Total knee arthroplasty (Right, 11/2022).  Family History   Problem Relation Name Age of Onset    Cancer Mother      Other (cardiac disorder) Father       Oncology History    No history exists.       Julio White  reports that he quit smoking about 2 years " ago. His smoking use included cigarettes. He has never used smokeless tobacco.  He  reports current alcohol use of about 12.0 standard drinks of alcohol per week.  He  reports that he does not currently use drugs.    Physical Exam  Vitals reviewed.   Constitutional:       Appearance: Normal appearance.   HENT:      Head: Normocephalic.      Mouth/Throat:      Mouth: Mucous membranes are moist.   Eyes:      Extraocular Movements: Extraocular movements intact.      Pupils: Pupils are equal, round, and reactive to light.   Cardiovascular:      Rate and Rhythm: Normal rate and regular rhythm.      Heart sounds: Normal heart sounds.   Pulmonary:      Breath sounds: Normal breath sounds.   Abdominal:      General: Bowel sounds are normal.      Palpations: Abdomen is soft.   Musculoskeletal:         General: Normal range of motion.      Cervical back: Normal range of motion and neck supple.   Skin:     General: Skin is warm.   Neurological:      General: No focal deficit present.      Mental Status: He is alert and oriented to person, place, and time.   Psychiatric:         Mood and Affect: Mood normal.         Behavior: Behavior normal.         WBC   Date/Time Value Ref Range Status   05/15/2023 07:25 AM 8.7 4.4 - 11.3 x10E9/L Final   11/16/2022 05:49 AM 10.4 4.4 - 11.3 x10E9/L Final   11/15/2022 06:30 AM 13.0 (H) 4.4 - 11.3 x10E9/L Final     nRBC   Date Value Ref Range Status   05/15/2023 0.0 0.0 - 0.0 /100 WBC Final   11/16/2022 0.0 0.0 - 0.0 /100 WBC Final   11/15/2022 0.0 0.0 - 0.0 /100 WBC Final     RBC   Date Value Ref Range Status   05/15/2023 4.92 4.50 - 5.90 x10E12/L Final   11/16/2022 3.87 (L) 4.50 - 5.90 x10E12/L Final   11/15/2022 3.83 (L) 4.50 - 5.90 x10E12/L Final     Hemoglobin   Date Value Ref Range Status   05/15/2023 15.0 13.5 - 17.5 g/dL Final   11/16/2022 12.4 (L) 13.5 - 17.5 g/dL Final   11/15/2022 12.2 (L) 13.5 - 17.5 g/dL Final     Hematocrit   Date Value Ref Range Status   05/15/2023 46.4 41.0 -  "52.0 % Final   11/16/2022 38.0 (L) 41.0 - 52.0 % Final   11/15/2022 37.0 (L) 41.0 - 52.0 % Final     MCV   Date/Time Value Ref Range Status   05/15/2023 07:25 AM 94 80 - 100 fL Final   11/16/2022 05:49 AM 98 80 - 100 fL Final   11/15/2022 06:30 AM 97 80 - 100 fL Final     No results found for: \"MCH\"  MCHC   Date/Time Value Ref Range Status   05/15/2023 07:25 AM 32.3 32.0 - 36.0 g/dL Final   11/16/2022 05:49 AM 32.6 32.0 - 36.0 g/dL Final   11/15/2022 06:30 AM 33.0 32.0 - 36.0 g/dL Final     RDW   Date/Time Value Ref Range Status   05/15/2023 07:25 AM 13.8 11.5 - 14.5 % Final   11/16/2022 05:49 AM 13.8 11.5 - 14.5 % Final   11/15/2022 06:30 AM 13.4 11.5 - 14.5 % Final     Platelets   Date/Time Value Ref Range Status   05/15/2023 07:25  150 - 450 x10E9/L Final   11/16/2022 05:49  150 - 450 x10E9/L Final   11/15/2022 06:30  150 - 450 x10E9/L Final     No results found for: \"MPV\"  Neutrophils %   Date/Time Value Ref Range Status   11/16/2022 05:49 AM 76.2 40.0 - 80.0 % Final   11/15/2022 06:30 AM 75.3 40.0 - 80.0 % Final     Immature Granulocytes %, Automated   Date/Time Value Ref Range Status   11/16/2022 05:49 AM 0.7 0.0 - 0.9 % Final     Comment:      Immature Granulocyte Count (IG) includes promyelocytes,    myelocytes and metamyelocytes but does not include bands.   Percent differential counts (%) should be interpreted in the   context of the absolute cell counts (cells/L).     11/15/2022 06:30 AM 0.6 0.0 - 0.9 % Final     Comment:      Immature Granulocyte Count (IG) includes promyelocytes,    myelocytes and metamyelocytes but does not include bands.   Percent differential counts (%) should be interpreted in the   context of the absolute cell counts (cells/L).       Lymphocytes %   Date/Time Value Ref Range Status   11/16/2022 05:49 AM 10.5 13.0 - 44.0 % Final   11/15/2022 06:30 AM 11.9 13.0 - 44.0 % Final     Monocytes %   Date/Time Value Ref Range Status   11/16/2022 05:49 AM 11.0 2.0 - 10.0 % " "Final   11/15/2022 06:30 AM 11.7 2.0 - 10.0 % Final     Eosinophils %   Date/Time Value Ref Range Status   11/16/2022 05:49 AM 1.1 0.0 - 6.0 % Final   11/15/2022 06:30 AM 0.3 0.0 - 6.0 % Final     Basophils %   Date/Time Value Ref Range Status   11/16/2022 05:49 AM 0.5 0.0 - 2.0 % Final   11/15/2022 06:30 AM 0.2 0.0 - 2.0 % Final     Neutrophils Absolute   Date/Time Value Ref Range Status   11/16/2022 05:49 AM 7.95 (H) 1.20 - 7.70 x10E9/L Final   11/15/2022 06:30 AM 9.75 (H) 1.20 - 7.70 x10E9/L Final     No results found for: \"IGABSOL\"  Lymphocytes Absolute   Date/Time Value Ref Range Status   11/16/2022 05:49 AM 1.09 (L) 1.20 - 4.80 x10E9/L Final   11/15/2022 06:30 AM 1.54 1.20 - 4.80 x10E9/L Final     Monocytes Absolute   Date/Time Value Ref Range Status   11/16/2022 05:49 AM 1.15 (H) 0.10 - 1.00 x10E9/L Final   11/15/2022 06:30 AM 1.51 (H) 0.10 - 1.00 x10E9/L Final     Eosinophils Absolute   Date/Time Value Ref Range Status   11/16/2022 05:49 AM 0.11 0.00 - 0.70 x10E9/L Final   11/15/2022 06:30 AM 0.04 0.00 - 0.70 x10E9/L Final     Basophils Absolute   Date/Time Value Ref Range Status   11/16/2022 05:49 AM 0.05 0.00 - 0.10 x10E9/L Final   11/15/2022 06:30 AM 0.03 0.00 - 0.10 x10E9/L Final       No components found for: \"PT\"  aPTT   Date/Time Value Ref Range Status   02/17/2022 08:00 AM 30 26 - 39 sec Final     Comment:     Note new reference range as of 11/30/2021 at 10:00am.     Medication Documentation Review Audit       Reviewed by Rocio Enciso MA (Medical Assistant) on 02/27/24 at 1443      Medication Order Taking? Sig Documenting Provider Last Dose Status   apixaban (Eliquis) 5 mg tablet [id number] Yes Take 1 tablet (5 mg) by mouth 2 times a day. Tracy Vergara MD Taking Active   aspirin 81 mg chewable tablet [id number] No Chew 1 tablet (81 mg) once daily. resume after you complete course of Aspirin 81mg Bid X 30 days, to help reduce your risk for blood clots Historical Provider, MD Not Taking Active "   clindamycin (Cleocin T) 1 % external solution [id number] No 1 Application. Historical Provider, MD Not Taking Active   clindamycin (Cleocin T) 1 % lotion [id number] Yes Clindamycin Phosphate 1 % External Lotion   Refills: 0       Active Historical Provider, MD Taking Active   diazePAM (Valium) 5 mg tablet [id number] No Take 1 tablet (5 mg) by mouth once daily as needed. Historical Provider, MD Not Taking Active   esomeprazole (NexIUM) 20 mg DR capsule [id number] No TAKE 1 CAPSULE BY MOUTH EVERY DAY   Patient not taking: Reported on 2/12/2024    Jim Keller MD Not Taking Active   levothyroxine (Synthroid, Levoxyl) 125 mcg tablet [id number] Yes TAKE 1 TABLET BY MOUTH EVERY DAY Jim Keller MD Taking Active   lisinopril 5 mg tablet [id number] Yes TAKE 1 TABLET BY MOUTH EVERY DAY Jim Keller MD Taking Active   magnesium oxide (Mag-Ox) 400 mg (241.3 mg magnesium) tablet [id number] Yes TAKE 1 TABLET BY MOUTH TWICE A DAY Aruna Mireles MD Taking Active   metoprolol succinate XL (Toprol-XL) 100 mg 24 hr tablet [id number] Yes TAKE 1 TABLET BY MOUTH EVERY DAY Aruan Mireles MD Taking Active   multivitamin tablet [id number] Yes Take 1 tablet by mouth once daily. Historical Provider, MD Taking Active   polyethylene glycol (Miralax) 17 gram/dose powder [id number] Yes Take 17 g by mouth once daily as needed. Historical Provider, MD Taking Active                   Assessment/Plan    1) pulmonary embolus  -he was sent for a CT angio of his chest on 1/12/2024 which showed incidental finding of filling defect within a subsegmental right upper lobar pulmonary artery  -he was started on eliquis  -he recalls feeling mildly short of breath for the last few months  -he had a viral syndrome in December 2023, though his Covid test was negative, he says he was pretty sure he had Covid  -he underwent right knee total replacement surgery on 11/14/2022  -he was sent for a doppler of his legs on 1/31/2024 showing on the right no evidence of  "acute DVT in right leg; there are chronic changes visualized in the mid femoral and popliteal veins; cystic structure in the popliteal fossa; on the left--no evidence of acute DVT in left leg; cystic structure in popliteal fossa  -since the doppler showed \"chronic\" vascular changes, the presumption is that he may have thrown a DVT after his right knee replacement surgery  -Covid infection, prior VTE orthopedic surgeries, and advanced aga are all risk factors for VTE  -I advised him to continue with eliquis to complete at least a 3 month course  -in mid-April he will have a followup perfusion study + doppler of legs, after which we will re-stratisfy his risk factors for VTE      2) hypothyroidism  -on levothyroxine    3) hypertension  -on lisinopril    4) PVCs  -has history of ventricular tachycardia  -s/p ablation of slow pathway of AV sakina  -on metoprolol    5) aortic root aneurysm  -CT angio of the chest done on 1/12/2024 showed no evidence for acute aortic pathology, such as dissection, intramural hematoma, or contained rupture     Problem List Items Addressed This Visit    None  Visit Diagnoses         Codes    Single subsegmental pulmonary embolism without acute cor pulmonale (CMS/HCC)    -  Primary I26.93    Relevant Orders    Vascular US lower extremity venous duplex bilateral    NM Lung Perfusion    Clinic Appointment Request Follow Up; FILIPE HERNANDEZ; [facility]    Hypercoagulable state (CMS/HCC)     D68.59    Relevant Orders    Vascular US lower extremity venous duplex bilateral    NM Lung Perfusion    Clinic Appointment Request Follow Up; FILIPE HERNANDEZ; [facility]                 Filipe Hernandez MD                         "

## 2024-02-27 NOTE — PATIENT INSTRUCTIONS
Continue the eliquis for now--minimum for 3 months    You will have a perfusion study and doppler of legs done in early April

## 2024-03-10 PROBLEM — D68.59 HYPERCOAGULABLE STATE (MULTI): Status: ACTIVE | Noted: 2024-03-10

## 2024-03-10 PROBLEM — I26.93 SINGLE SUBSEGMENTAL PULMONARY EMBOLISM WITHOUT ACUTE COR PULMONALE (MULTI): Status: ACTIVE | Noted: 2024-03-10

## 2024-03-10 ASSESSMENT — ENCOUNTER SYMPTOMS
GASTROINTESTINAL NEGATIVE: 1
ENDOCRINE NEGATIVE: 1
NEUROLOGICAL NEGATIVE: 1
EYES NEGATIVE: 1
CARDIOVASCULAR NEGATIVE: 1
RESPIRATORY NEGATIVE: 1
MUSCULOSKELETAL NEGATIVE: 1
CONSTITUTIONAL NEGATIVE: 1
PSYCHIATRIC NEGATIVE: 1
HEMATOLOGIC/LYMPHATIC NEGATIVE: 1

## 2024-04-08 ENCOUNTER — HOSPITAL ENCOUNTER (OUTPATIENT)
Dept: RADIOLOGY | Facility: HOSPITAL | Age: 69
Discharge: HOME | End: 2024-04-08
Payer: COMMERCIAL

## 2024-04-08 ENCOUNTER — HOSPITAL ENCOUNTER (OUTPATIENT)
Dept: CARDIOLOGY | Facility: HOSPITAL | Age: 69
Discharge: HOME | End: 2024-04-08
Payer: COMMERCIAL

## 2024-04-08 DIAGNOSIS — D68.59 HYPERCOAGULABLE STATE (MULTI): ICD-10-CM

## 2024-04-08 DIAGNOSIS — I26.93 SINGLE SUBSEGMENTAL PULMONARY EMBOLISM WITHOUT ACUTE COR PULMONALE (MULTI): ICD-10-CM

## 2024-04-08 DIAGNOSIS — I26.99 OTHER PULMONARY EMBOLISM WITHOUT ACUTE COR PULMONALE (MULTI): ICD-10-CM

## 2024-04-08 PROCEDURE — 93970 EXTREMITY STUDY: CPT | Performed by: SURGERY

## 2024-04-08 PROCEDURE — 78580 LUNG PERFUSION IMAGING: CPT

## 2024-04-08 PROCEDURE — 3430000001 HC RX 343 DIAGNOSTIC RADIOPHARMACEUTICALS: Performed by: INTERNAL MEDICINE

## 2024-04-08 PROCEDURE — A9540 TC99M MAA: HCPCS | Performed by: INTERNAL MEDICINE

## 2024-04-08 PROCEDURE — 93970 EXTREMITY STUDY: CPT

## 2024-04-08 RX ADMIN — KIT FOR THE PREPARATION OF TECHNETIUM TC 99M ALBUMIN AGGREGATED 4.3 MILLICURIE: 2.5 INJECTION, POWDER, FOR SOLUTION INTRAVENOUS at 08:50

## 2024-04-12 ENCOUNTER — OFFICE VISIT (OUTPATIENT)
Dept: HEMATOLOGY/ONCOLOGY | Facility: CLINIC | Age: 69
End: 2024-04-12
Payer: COMMERCIAL

## 2024-04-12 VITALS
SYSTOLIC BLOOD PRESSURE: 130 MMHG | BODY MASS INDEX: 39.25 KG/M2 | TEMPERATURE: 96.8 F | HEART RATE: 58 BPM | DIASTOLIC BLOOD PRESSURE: 88 MMHG | RESPIRATION RATE: 18 BRPM | OXYGEN SATURATION: 95 % | WEIGHT: 279.1 LBS

## 2024-04-12 DIAGNOSIS — I10 HYPERTENSION, ESSENTIAL, BENIGN: ICD-10-CM

## 2024-04-12 DIAGNOSIS — D68.59 HYPERCOAGULABLE STATE (MULTI): ICD-10-CM

## 2024-04-12 DIAGNOSIS — I71.21 AORTIC ROOT ANEURYSM (CMS-HCC): ICD-10-CM

## 2024-04-12 DIAGNOSIS — I26.93 SINGLE SUBSEGMENTAL PULMONARY EMBOLISM WITHOUT ACUTE COR PULMONALE (MULTI): ICD-10-CM

## 2024-04-12 DIAGNOSIS — E03.8 HYPOTHYROIDISM DUE TO HASHIMOTO'S THYROIDITIS: Primary | ICD-10-CM

## 2024-04-12 DIAGNOSIS — E06.3 HYPOTHYROIDISM DUE TO HASHIMOTO'S THYROIDITIS: Primary | ICD-10-CM

## 2024-04-12 DIAGNOSIS — I49.3 PVC (PREMATURE VENTRICULAR CONTRACTION): ICD-10-CM

## 2024-04-12 PROCEDURE — 3075F SYST BP GE 130 - 139MM HG: CPT | Performed by: INTERNAL MEDICINE

## 2024-04-12 PROCEDURE — 1159F MED LIST DOCD IN RCRD: CPT | Performed by: INTERNAL MEDICINE

## 2024-04-12 PROCEDURE — 1160F RVW MEDS BY RX/DR IN RCRD: CPT | Performed by: INTERNAL MEDICINE

## 2024-04-12 PROCEDURE — 3079F DIAST BP 80-89 MM HG: CPT | Performed by: INTERNAL MEDICINE

## 2024-04-12 PROCEDURE — 1157F ADVNC CARE PLAN IN RCRD: CPT | Performed by: INTERNAL MEDICINE

## 2024-04-12 PROCEDURE — 3008F BODY MASS INDEX DOCD: CPT | Performed by: INTERNAL MEDICINE

## 2024-04-12 PROCEDURE — 99214 OFFICE O/P EST MOD 30 MIN: CPT | Performed by: INTERNAL MEDICINE

## 2024-04-12 PROCEDURE — 1126F AMNT PAIN NOTED NONE PRSNT: CPT | Performed by: INTERNAL MEDICINE

## 2024-04-12 ASSESSMENT — PAIN SCALES - GENERAL: PAINLEVEL: 0-NO PAIN

## 2024-04-12 NOTE — PATIENT INSTRUCTIONS
You have elected to remain on full dose eliquis, given that you also have atrial fibrillation    Hold eliquis on Saturday and Sunday    Report to my office lab on Monday morning to get your labs drawn to screen you for inheritable thrombophilia

## 2024-04-14 ASSESSMENT — ENCOUNTER SYMPTOMS
GASTROINTESTINAL NEGATIVE: 1
HEMATOLOGIC/LYMPHATIC NEGATIVE: 1
CARDIOVASCULAR NEGATIVE: 1
PSYCHIATRIC NEGATIVE: 1
EYES NEGATIVE: 1
ENDOCRINE NEGATIVE: 1
MUSCULOSKELETAL NEGATIVE: 1
NEUROLOGICAL NEGATIVE: 1
RESPIRATORY NEGATIVE: 1
CONSTITUTIONAL NEGATIVE: 1

## 2024-04-14 NOTE — PROGRESS NOTES
Patient ID: Julio White is a 68 y.o. male.  Referring Physician: Filipe Hernandez MD  [address] [address]  [place] [address]  Primary Care Provider: Jim Keller MD  Visit Type: Follow Up      Subjective    HPI How were my new imaging studies?  I also have a touch of atrial fibrillation    Review of Systems   Constitutional: Negative.    HENT:  Negative.     Eyes: Negative.    Respiratory: Negative.     Cardiovascular: Negative.    Gastrointestinal: Negative.    Endocrine: Negative.    Genitourinary: Negative.     Musculoskeletal: Negative.    Skin: Negative.    Neurological: Negative.    Hematological: Negative.    Psychiatric/Behavioral: Negative.          Objective   BSA: 2.52 meters squared  /88 (BP Location: Right arm)   Pulse 58   Temp 36 °C (96.8 °F) (Temporal)   Resp 18   Wt 127 kg (279 lb 1.6 oz)   SpO2 95%   BMI 39.25 kg/m²      has a past medical history of Body mass index (BMI) 37.0-37.9, adult (11/01/2021), Obesity, unspecified (08/10/2022), Other conditions influencing health status (11/01/2021), Personal history of other diseases of the circulatory system (08/04/2022), Personal history of other diseases of the respiratory system (10/19/2022), and Personal history of other endocrine, nutritional and metabolic disease (11/01/2021).   has a past surgical history that includes Other surgical history (10/05/2021); Other surgical history (10/05/2021); Other surgical history (09/26/2022); and Total knee arthroplasty (Right, 11/2022).  Family History   Problem Relation Name Age of Onset    Cancer Mother      Other (cardiac disorder) Father       Oncology History    No history exists.       Julio White  reports that he quit smoking about 2 years ago. His smoking use included cigarettes. He has never used smokeless tobacco.  He  reports current alcohol use of about 12.0 standard drinks of alcohol per week.  He  reports that he does not currently use drugs.    Physical  Exam  Vitals reviewed.   Constitutional:       Appearance: Normal appearance.   HENT:      Head: Normocephalic.      Mouth/Throat:      Mouth: Mucous membranes are moist.   Eyes:      Extraocular Movements: Extraocular movements intact.      Pupils: Pupils are equal, round, and reactive to light.   Cardiovascular:      Rate and Rhythm: Normal rate. Rhythm irregular.      Heart sounds: Normal heart sounds.   Pulmonary:      Breath sounds: Normal breath sounds.   Abdominal:      General: Bowel sounds are normal.      Palpations: Abdomen is soft.   Musculoskeletal:         General: Normal range of motion.      Cervical back: Normal range of motion and neck supple.   Skin:     General: Skin is warm.   Neurological:      General: No focal deficit present.      Mental Status: He is alert and oriented to person, place, and time.   Psychiatric:         Behavior: Behavior normal.       WBC   Date/Time Value Ref Range Status   05/15/2023 07:25 AM 8.7 4.4 - 11.3 x10E9/L Final   11/16/2022 05:49 AM 10.4 4.4 - 11.3 x10E9/L Final   11/15/2022 06:30 AM 13.0 (H) 4.4 - 11.3 x10E9/L Final     nRBC   Date Value Ref Range Status   05/15/2023 0.0 0.0 - 0.0 /100 WBC Final   11/16/2022 0.0 0.0 - 0.0 /100 WBC Final   11/15/2022 0.0 0.0 - 0.0 /100 WBC Final     RBC   Date Value Ref Range Status   05/15/2023 4.92 4.50 - 5.90 x10E12/L Final   11/16/2022 3.87 (L) 4.50 - 5.90 x10E12/L Final   11/15/2022 3.83 (L) 4.50 - 5.90 x10E12/L Final     Hemoglobin   Date Value Ref Range Status   05/15/2023 15.0 13.5 - 17.5 g/dL Final   11/16/2022 12.4 (L) 13.5 - 17.5 g/dL Final   11/15/2022 12.2 (L) 13.5 - 17.5 g/dL Final     Hematocrit   Date Value Ref Range Status   05/15/2023 46.4 41.0 - 52.0 % Final   11/16/2022 38.0 (L) 41.0 - 52.0 % Final   11/15/2022 37.0 (L) 41.0 - 52.0 % Final     MCV   Date/Time Value Ref Range Status   05/15/2023 07:25 AM 94 80 - 100 fL Final   11/16/2022 05:49 AM 98 80 - 100 fL Final   11/15/2022 06:30 AM 97 80 - 100 fL Final  "    No results found for: \"MCH\"  MCHC   Date/Time Value Ref Range Status   05/15/2023 07:25 AM 32.3 32.0 - 36.0 g/dL Final   11/16/2022 05:49 AM 32.6 32.0 - 36.0 g/dL Final   11/15/2022 06:30 AM 33.0 32.0 - 36.0 g/dL Final     RDW   Date/Time Value Ref Range Status   05/15/2023 07:25 AM 13.8 11.5 - 14.5 % Final   11/16/2022 05:49 AM 13.8 11.5 - 14.5 % Final   11/15/2022 06:30 AM 13.4 11.5 - 14.5 % Final     Platelets   Date/Time Value Ref Range Status   05/15/2023 07:25  150 - 450 x10E9/L Final   11/16/2022 05:49  150 - 450 x10E9/L Final   11/15/2022 06:30  150 - 450 x10E9/L Final     No results found for: \"MPV\"  Neutrophils %   Date/Time Value Ref Range Status   11/16/2022 05:49 AM 76.2 40.0 - 80.0 % Final   11/15/2022 06:30 AM 75.3 40.0 - 80.0 % Final     Immature Granulocytes %, Automated   Date/Time Value Ref Range Status   11/16/2022 05:49 AM 0.7 0.0 - 0.9 % Final     Comment:      Immature Granulocyte Count (IG) includes promyelocytes,    myelocytes and metamyelocytes but does not include bands.   Percent differential counts (%) should be interpreted in the   context of the absolute cell counts (cells/L).     11/15/2022 06:30 AM 0.6 0.0 - 0.9 % Final     Comment:      Immature Granulocyte Count (IG) includes promyelocytes,    myelocytes and metamyelocytes but does not include bands.   Percent differential counts (%) should be interpreted in the   context of the absolute cell counts (cells/L).       Lymphocytes %   Date/Time Value Ref Range Status   11/16/2022 05:49 AM 10.5 13.0 - 44.0 % Final   11/15/2022 06:30 AM 11.9 13.0 - 44.0 % Final     Monocytes %   Date/Time Value Ref Range Status   11/16/2022 05:49 AM 11.0 2.0 - 10.0 % Final   11/15/2022 06:30 AM 11.7 2.0 - 10.0 % Final     Eosinophils %   Date/Time Value Ref Range Status   11/16/2022 05:49 AM 1.1 0.0 - 6.0 % Final   11/15/2022 06:30 AM 0.3 0.0 - 6.0 % Final     Basophils %   Date/Time Value Ref Range Status   11/16/2022 05:49 AM 0.5 " "0.0 - 2.0 % Final   11/15/2022 06:30 AM 0.2 0.0 - 2.0 % Final     Neutrophils Absolute   Date/Time Value Ref Range Status   11/16/2022 05:49 AM 7.95 (H) 1.20 - 7.70 x10E9/L Final   11/15/2022 06:30 AM 9.75 (H) 1.20 - 7.70 x10E9/L Final     No results found for: \"IGABSOL\"  Lymphocytes Absolute   Date/Time Value Ref Range Status   11/16/2022 05:49 AM 1.09 (L) 1.20 - 4.80 x10E9/L Final   11/15/2022 06:30 AM 1.54 1.20 - 4.80 x10E9/L Final     Monocytes Absolute   Date/Time Value Ref Range Status   11/16/2022 05:49 AM 1.15 (H) 0.10 - 1.00 x10E9/L Final   11/15/2022 06:30 AM 1.51 (H) 0.10 - 1.00 x10E9/L Final     Eosinophils Absolute   Date/Time Value Ref Range Status   11/16/2022 05:49 AM 0.11 0.00 - 0.70 x10E9/L Final   11/15/2022 06:30 AM 0.04 0.00 - 0.70 x10E9/L Final     Basophils Absolute   Date/Time Value Ref Range Status   11/16/2022 05:49 AM 0.05 0.00 - 0.10 x10E9/L Final   11/15/2022 06:30 AM 0.03 0.00 - 0.10 x10E9/L Final       No components found for: \"PT\"  aPTT   Date/Time Value Ref Range Status   02/17/2022 08:00 AM 30 26 - 39 sec Final     Comment:     Note new reference range as of 11/30/2021 at 10:00am.     Medication Documentation Review Audit       Reviewed by Rocio Enciso MA (Medical Assistant) on 04/12/24 at 1540      Medication Order Taking? Sig Documenting Provider Last Dose Status   apixaban (Eliquis) 5 mg tablet [id number] Yes Take 1 tablet (5 mg) by mouth 2 times a day. Tracy Vergara MD Taking Active   aspirin 81 mg chewable tablet [id number] No Chew 1 tablet (81 mg) once daily. resume after you complete course of Aspirin 81mg Bid X 30 days, to help reduce your risk for blood clots Historical Provider, MD Not Taking Active   clindamycin (Cleocin T) 1 % external solution [id number] Yes 1 Application. Rosa Maria Ambriz MD Taking Active   clindamycin (Cleocin T) 1 % lotion [id number] No Clindamycin Phosphate 1 % External Lotion   Refills: 0       Active Historical Provider, MD Not Taking " "Active   diazePAM (Valium) 5 mg tablet [id number] Yes Take 1 tablet (5 mg) by mouth once daily as needed. Historical Provider, MD Taking Active   esomeprazole (NexIUM) 20 mg DR capsule [id number]  TAKE 1 CAPSULE BY MOUTH EVERY DAY   Patient not taking: Reported on 2/12/2024    Jim Keller MD  Active   levothyroxine (Synthroid, Levoxyl) 125 mcg tablet [id number] Yes TAKE 1 TABLET BY MOUTH EVERY DAY Jim Keller MD Taking Active   lisinopril 5 mg tablet [id number] Yes TAKE 1 TABLET BY MOUTH EVERY DAY Jim Keller MD Taking Active   magnesium oxide (Mag-Ox) 400 mg (241.3 mg magnesium) tablet [id number] Yes TAKE 1 TABLET BY MOUTH TWICE A DAY Aruna Mireles MD Taking Active   metoprolol succinate XL (Toprol-XL) 100 mg 24 hr tablet [id number] Yes TAKE 1 TABLET BY MOUTH EVERY DAY Aruna Mireles MD Taking Active   multivitamin tablet [id number] Yes Take 1 tablet by mouth once daily. Historical Provider, MD Taking Active   polyethylene glycol (Miralax) 17 gram/dose powder [id number] No Take 17 g by mouth once daily as needed. Historical Provider, MD Not Taking Active                   Assessment/Plan    1) pulmonary embolus  -he was sent for a CT angio of his chest on 1/12/2024 which showed incidental finding of filling defect within a subsegmental right upper lobar pulmonary artery  -he was started on eliquis  -he recalls feeling mildly short of breath for the last few months  -he had a viral syndrome in December 2023, though his Covid test was negative, he says he was pretty sure he had Covid  -he underwent right knee total replacement surgery on 11/14/2022  -he was sent for a doppler of his legs on 1/31/2024 showing on the right no evidence of acute DVT in right leg; there are chronic changes visualized in the mid femoral and popliteal veins; cystic structure in the popliteal fossa; on the left--no evidence of acute DVT in left leg; cystic structure in popliteal fossa  -since the doppler showed \"chronic\" vascular " changes, the presumption is that he may have thrown a DVT after his right knee replacement surgery  -Covid infection, prior VTE orthopedic surgeries, and advanced aga are all risk factors for VTE  -he has now completed a 3 month course of full dose elquis  -he says he is still dealing with arrhythmia  -here to review new imaging--doppler of legs done on 4/8/2024 showed no evidence of acute DVT in the left leg; in his right leg there is reflux in the popliteal vein; no evidence of acute DVT, there are chronic changes seen in the popliteal vein  -lung perfusion study showed no new wedge-shaped subsegmental or segmental perfusion defect to suggest acute PE; a wedge shaped subsegmental perfusion defect in right upper lobe likely corresponding to abnormal findings seen on prior CTA likely representing a chronic pulmonary embolism  -given that he also has arrhythmia/atrial fibrillation, I have recommended that he stay on eliquis at full dose-- 5 mg BID; he says his copay per month is only 10 dollars  -he would like be screened for inheritable thrombophilia--we cannot do that while he is actively anticoagulated, so I advised him to hold eliquis on Saturday and Sunday  -he will come to our office lab on Monday morning to draw bloodwork to screen for factor V Leiden, prothrombin gene mutation, protein C and S deficiency, antithrombin deficiency, and the lupus anticoagulant  -he was also advised to resume eliquis as soon as his bloodwork is drawn        2) hypothyroidism  -on levothyroxine     3) hypertension  -on lisinopril     4) PVCs  -has history of ventricular tachycardia  -s/p ablation of slow pathway of AV node  -on metoprolol     5) aortic root aneurysm  -CT angio of the chest done on 1/12/2024 showed no evidence for acute aortic pathology, such as dissection, intramural hematoma, or contained rupture     Problem List Items Addressed This Visit             ICD-10-CM    Single subsegmental pulmonary embolism without  acute cor pulmonale (Multi) I26.93    Relevant Orders    Factor V Leiden    Prothrombin Gene Mutation    Antithrombin Antigen    Antithrombin    Protein C Antigen, Total    Protein C Activity    Protein S Activity    Protein S Antigen, Total    DRVVT (Dilute Jai Viper Venom Time)    Anti-Cardiolipin Antibody (IgA, IgG, and IgM)    Beta-2 Glycoprotein Antibodies    Hypercoagulable state (Multi) D68.59    Relevant Orders    Factor V Leiden    Prothrombin Gene Mutation    Antithrombin Antigen    Antithrombin    Protein C Antigen, Total    Protein C Activity    Protein S Activity    Protein S Antigen, Total    DRVVT (Dilute Jai Viper Venom Time)    Anti-Cardiolipin Antibody (IgA, IgG, and IgM)    Beta-2 Glycoprotein Antibodies    Clinic Appointment Request Virtual Est; FILIPE HERNANDEZ; [id number]            Filipe Hernandez MD

## 2024-04-15 ENCOUNTER — LAB (OUTPATIENT)
Dept: LAB | Facility: CLINIC | Age: 69
End: 2024-04-15
Payer: COMMERCIAL

## 2024-04-15 ENCOUNTER — APPOINTMENT (OUTPATIENT)
Dept: HEMATOLOGY/ONCOLOGY | Facility: CLINIC | Age: 69
End: 2024-04-15
Payer: COMMERCIAL

## 2024-04-15 DIAGNOSIS — I26.93 SINGLE SUBSEGMENTAL PULMONARY EMBOLISM WITHOUT ACUTE COR PULMONALE (MULTI): ICD-10-CM

## 2024-04-15 DIAGNOSIS — D68.59 HYPERCOAGULABLE STATE (MULTI): ICD-10-CM

## 2024-04-15 LAB
B2 GLYCOPROT1 IGA SER-ACNC: 1.7 U/ML
B2 GLYCOPROT1 IGG SER-ACNC: <1.4 U/ML
B2 GLYCOPROT1 IGM SER-ACNC: 6.5 U/ML
CARDIOLIPIN IGA SERPL-ACNC: 0.9 APL U/ML
CARDIOLIPIN IGG SER IA-ACNC: <1.6 GPL U/ML
CARDIOLIPIN IGM SER IA-ACNC: 4.4 MPL U/ML
DRVVT SCREEN TO CONFIRM RATIO: 1.05 RATIO
DRVVT/DRVVT CFM NRMLZD PPP-RTO: 0.93 RATIO
DRVVT/DRVVT CFM P DOAC NEUT NORM PPP-RTO: 1.14 RATIO

## 2024-04-15 PROCEDURE — 85303 CLOT INHIBIT PROT C ACTIVITY: CPT

## 2024-04-15 PROCEDURE — 85613 RUSSELL VIPER VENOM DILUTED: CPT

## 2024-04-15 PROCEDURE — 81241 F5 GENE: CPT

## 2024-04-15 PROCEDURE — 85302 CLOT INHIBIT PROT C ANTIGEN: CPT

## 2024-04-15 PROCEDURE — 85306 CLOT INHIBIT PROT S FREE: CPT

## 2024-04-15 PROCEDURE — 86147 CARDIOLIPIN ANTIBODY EA IG: CPT

## 2024-04-15 PROCEDURE — 85301 ANTITHROMBIN III ANTIGEN: CPT

## 2024-04-15 PROCEDURE — 85305 CLOT INHIBIT PROT S TOTAL: CPT

## 2024-04-15 PROCEDURE — 36415 COLL VENOUS BLD VENIPUNCTURE: CPT

## 2024-04-15 PROCEDURE — 81240 F2 GENE: CPT

## 2024-04-15 PROCEDURE — 86146 BETA-2 GLYCOPROTEIN ANTIBODY: CPT

## 2024-04-15 PROCEDURE — 85300 ANTITHROMBIN III ACTIVITY: CPT

## 2024-04-16 LAB
AT III ACT/NOR PPP CHRO: 123 % ACTIVITY (ref 80–130)
PROT C ACT/NOR PPP CHRO: 124 % ACTIVITY (ref 70–150)
PROT S ACT/NOR PPP: 137 % ACTIVITY (ref 64–150)

## 2024-04-17 LAB
AT III AG ACT/NOR PPP IA: 113 % (ref 82–136)
PROT C AG ACT/NOR PPP IA: >95 % (ref 63–153)
PROT S AG ACT/NOR PPP IA: 134 % (ref 84–134)

## 2024-04-22 LAB
ELECTRONICALLY SIGNED BY: ABNORMAL
ELECTRONICALLY SIGNED BY: NORMAL
FACTOR II-PROTHROMBIN INTERPRETATION: ABNORMAL
FACTOR II-PROTHROMBIN RESULT: ABNORMAL
FACTOR V LEIDEN INTERPRETATION: NORMAL
FACTOR V LEIDEN RESULT: NORMAL

## 2024-04-23 ENCOUNTER — APPOINTMENT (OUTPATIENT)
Dept: PRIMARY CARE | Facility: CLINIC | Age: 69
End: 2024-04-23
Payer: COMMERCIAL

## 2024-04-29 ENCOUNTER — TELEMEDICINE (OUTPATIENT)
Dept: HEMATOLOGY/ONCOLOGY | Facility: CLINIC | Age: 69
End: 2024-04-29
Payer: COMMERCIAL

## 2024-04-29 DIAGNOSIS — E06.3 HYPOTHYROIDISM DUE TO HASHIMOTO'S THYROIDITIS: ICD-10-CM

## 2024-04-29 DIAGNOSIS — I10 HYPERTENSION, ESSENTIAL, BENIGN: ICD-10-CM

## 2024-04-29 DIAGNOSIS — I49.3 PVC (PREMATURE VENTRICULAR CONTRACTION): ICD-10-CM

## 2024-04-29 DIAGNOSIS — D68.52 PROTHROMBIN GENE MUTATION (MULTI): ICD-10-CM

## 2024-04-29 DIAGNOSIS — E03.8 HYPOTHYROIDISM DUE TO HASHIMOTO'S THYROIDITIS: ICD-10-CM

## 2024-04-29 DIAGNOSIS — I26.93 SINGLE SUBSEGMENTAL PULMONARY EMBOLISM WITHOUT ACUTE COR PULMONALE (MULTI): Primary | ICD-10-CM

## 2024-04-29 DIAGNOSIS — I71.21 AORTIC ROOT ANEURYSM (CMS-HCC): ICD-10-CM

## 2024-04-29 PROCEDURE — 1160F RVW MEDS BY RX/DR IN RCRD: CPT | Performed by: INTERNAL MEDICINE

## 2024-04-29 PROCEDURE — 1159F MED LIST DOCD IN RCRD: CPT | Performed by: INTERNAL MEDICINE

## 2024-04-29 PROCEDURE — 3008F BODY MASS INDEX DOCD: CPT | Performed by: INTERNAL MEDICINE

## 2024-04-29 PROCEDURE — 99442 PR PHYS/QHP TELEPHONE EVALUATION 11-20 MIN: CPT | Performed by: INTERNAL MEDICINE

## 2024-04-29 PROCEDURE — 1157F ADVNC CARE PLAN IN RCRD: CPT | Performed by: INTERNAL MEDICINE

## 2024-04-30 PROBLEM — D68.52 PROTHROMBIN GENE MUTATION (MULTI): Status: ACTIVE | Noted: 2024-04-30

## 2024-04-30 ASSESSMENT — ENCOUNTER SYMPTOMS
ENDOCRINE NEGATIVE: 1
NEUROLOGICAL NEGATIVE: 1
HEMATOLOGIC/LYMPHATIC NEGATIVE: 1
EYES NEGATIVE: 1
CONSTITUTIONAL NEGATIVE: 1
MUSCULOSKELETAL NEGATIVE: 1
CARDIOVASCULAR NEGATIVE: 1
GASTROINTESTINAL NEGATIVE: 1
RESPIRATORY NEGATIVE: 1
PSYCHIATRIC NEGATIVE: 1

## 2024-04-30 NOTE — PROGRESS NOTES
Patient ID: Julio White is a 68 y.o. male.  Referring Physician: No referring provider defined for this encounter.  Primary Care Provider: Jim Keller MD  Visit Type:  Follow Up     Verbal consent was requested and obtained from patient on this date for a telehealth visit.    Subjective    HPI What did my bloodwork show?    Review of Systems   Constitutional: Negative.    HENT:  Negative.     Eyes: Negative.    Respiratory: Negative.     Cardiovascular: Negative.    Gastrointestinal: Negative.    Endocrine: Negative.    Genitourinary: Negative.     Musculoskeletal: Negative.    Skin: Negative.    Neurological: Negative.    Hematological: Negative.    Psychiatric/Behavioral: Negative.          Objective   BSA: There is no height or weight on file to calculate BSA.  There were no vitals taken for this visit.     has a past medical history of Body mass index (BMI) 37.0-37.9, adult (11/01/2021), Obesity, unspecified (08/10/2022), Other conditions influencing health status (11/01/2021), Personal history of other diseases of the circulatory system (08/04/2022), Personal history of other diseases of the respiratory system (10/19/2022), and Personal history of other endocrine, nutritional and metabolic disease (11/01/2021).   has a past surgical history that includes Other surgical history (10/05/2021); Other surgical history (10/05/2021); Other surgical history (09/26/2022); and Total knee arthroplasty (Right, 11/2022).  Family History   Problem Relation Name Age of Onset    Cancer Mother      Other (cardiac disorder) Father       Oncology History    No history exists.       Julio White  reports that he quit smoking about 2 years ago. His smoking use included cigarettes. He has never used smokeless tobacco.  He  reports current alcohol use of about 12.0 standard drinks of alcohol per week.  He  reports that he does not currently use drugs.    Physical Exam    WBC   Date/Time Value Ref Range Status   05/15/2023  "07:25 AM 8.7 4.4 - 11.3 x10E9/L Final   11/16/2022 05:49 AM 10.4 4.4 - 11.3 x10E9/L Final   11/15/2022 06:30 AM 13.0 (H) 4.4 - 11.3 x10E9/L Final     nRBC   Date Value Ref Range Status   05/15/2023 0.0 0.0 - 0.0 /100 WBC Final   11/16/2022 0.0 0.0 - 0.0 /100 WBC Final   11/15/2022 0.0 0.0 - 0.0 /100 WBC Final     RBC   Date Value Ref Range Status   05/15/2023 4.92 4.50 - 5.90 x10E12/L Final   11/16/2022 3.87 (L) 4.50 - 5.90 x10E12/L Final   11/15/2022 3.83 (L) 4.50 - 5.90 x10E12/L Final     Hemoglobin   Date Value Ref Range Status   05/15/2023 15.0 13.5 - 17.5 g/dL Final   11/16/2022 12.4 (L) 13.5 - 17.5 g/dL Final   11/15/2022 12.2 (L) 13.5 - 17.5 g/dL Final     Hematocrit   Date Value Ref Range Status   05/15/2023 46.4 41.0 - 52.0 % Final   11/16/2022 38.0 (L) 41.0 - 52.0 % Final   11/15/2022 37.0 (L) 41.0 - 52.0 % Final     MCV   Date/Time Value Ref Range Status   05/15/2023 07:25 AM 94 80 - 100 fL Final   11/16/2022 05:49 AM 98 80 - 100 fL Final   11/15/2022 06:30 AM 97 80 - 100 fL Final     No results found for: \"MCH\"  MCHC   Date/Time Value Ref Range Status   05/15/2023 07:25 AM 32.3 32.0 - 36.0 g/dL Final   11/16/2022 05:49 AM 32.6 32.0 - 36.0 g/dL Final   11/15/2022 06:30 AM 33.0 32.0 - 36.0 g/dL Final     RDW   Date/Time Value Ref Range Status   05/15/2023 07:25 AM 13.8 11.5 - 14.5 % Final   11/16/2022 05:49 AM 13.8 11.5 - 14.5 % Final   11/15/2022 06:30 AM 13.4 11.5 - 14.5 % Final     Platelets   Date/Time Value Ref Range Status   05/15/2023 07:25  150 - 450 x10E9/L Final   11/16/2022 05:49  150 - 450 x10E9/L Final   11/15/2022 06:30  150 - 450 x10E9/L Final     No results found for: \"MPV\"  Neutrophils %   Date/Time Value Ref Range Status   11/16/2022 05:49 AM 76.2 40.0 - 80.0 % Final   11/15/2022 06:30 AM 75.3 40.0 - 80.0 % Final     Immature Granulocytes %, Automated   Date/Time Value Ref Range Status   11/16/2022 05:49 AM 0.7 0.0 - 0.9 % Final     Comment:      Immature Granulocyte " "Count (IG) includes promyelocytes,    myelocytes and metamyelocytes but does not include bands.   Percent differential counts (%) should be interpreted in the   context of the absolute cell counts (cells/L).     11/15/2022 06:30 AM 0.6 0.0 - 0.9 % Final     Comment:      Immature Granulocyte Count (IG) includes promyelocytes,    myelocytes and metamyelocytes but does not include bands.   Percent differential counts (%) should be interpreted in the   context of the absolute cell counts (cells/L).       Lymphocytes %   Date/Time Value Ref Range Status   11/16/2022 05:49 AM 10.5 13.0 - 44.0 % Final   11/15/2022 06:30 AM 11.9 13.0 - 44.0 % Final     Monocytes %   Date/Time Value Ref Range Status   11/16/2022 05:49 AM 11.0 2.0 - 10.0 % Final   11/15/2022 06:30 AM 11.7 2.0 - 10.0 % Final     Eosinophils %   Date/Time Value Ref Range Status   11/16/2022 05:49 AM 1.1 0.0 - 6.0 % Final   11/15/2022 06:30 AM 0.3 0.0 - 6.0 % Final     Basophils %   Date/Time Value Ref Range Status   11/16/2022 05:49 AM 0.5 0.0 - 2.0 % Final   11/15/2022 06:30 AM 0.2 0.0 - 2.0 % Final     Neutrophils Absolute   Date/Time Value Ref Range Status   11/16/2022 05:49 AM 7.95 (H) 1.20 - 7.70 x10E9/L Final   11/15/2022 06:30 AM 9.75 (H) 1.20 - 7.70 x10E9/L Final     No results found for: \"IGABSOL\"  Lymphocytes Absolute   Date/Time Value Ref Range Status   11/16/2022 05:49 AM 1.09 (L) 1.20 - 4.80 x10E9/L Final   11/15/2022 06:30 AM 1.54 1.20 - 4.80 x10E9/L Final     Monocytes Absolute   Date/Time Value Ref Range Status   11/16/2022 05:49 AM 1.15 (H) 0.10 - 1.00 x10E9/L Final   11/15/2022 06:30 AM 1.51 (H) 0.10 - 1.00 x10E9/L Final     Eosinophils Absolute   Date/Time Value Ref Range Status   11/16/2022 05:49 AM 0.11 0.00 - 0.70 x10E9/L Final   11/15/2022 06:30 AM 0.04 0.00 - 0.70 x10E9/L Final     Basophils Absolute   Date/Time Value Ref Range Status   11/16/2022 05:49 AM 0.05 0.00 - 0.10 x10E9/L Final   11/15/2022 06:30 AM 0.03 0.00 - 0.10 x10E9/L " "Final       No components found for: \"PT\"  aPTT   Date/Time Value Ref Range Status   02/17/2022 08:00 AM 30 26 - 39 sec Final     Comment:     Note new reference range as of 11/30/2021 at 10:00am.     Medication Documentation Review Audit       Reviewed by Rocio Enciso MA (Medical Assistant) on 04/12/24 at 1540      Medication Order Taking? Sig Documenting Provider Last Dose Status   apixaban (Eliquis) 5 mg tablet [id number] Yes Take 1 tablet (5 mg) by mouth 2 times a day. Tracy Vergara MD Taking Active   aspirin 81 mg chewable tablet [id number] No Chew 1 tablet (81 mg) once daily. resume after you complete course of Aspirin 81mg Bid X 30 days, to help reduce your risk for blood clots Historical Provider, MD Not Taking Active   clindamycin (Cleocin T) 1 % external solution [id number] Yes 1 Application. Rosa Maria Ambriz MD Taking Active   clindamycin (Cleocin T) 1 % lotion [id number] No Clindamycin Phosphate 1 % External Lotion   Refills: 0       Active Historical Provider, MD Not Taking Active   diazePAM (Valium) 5 mg tablet [id number] Yes Take 1 tablet (5 mg) by mouth once daily as needed. Historical Provider, MD Taking Active   esomeprazole (NexIUM) 20 mg DR capsule [id number]  TAKE 1 CAPSULE BY MOUTH EVERY DAY   Patient not taking: Reported on 2/12/2024    Jim Keller MD  Active   levothyroxine (Synthroid, Levoxyl) 125 mcg tablet [id number] Yes TAKE 1 TABLET BY MOUTH EVERY DAY Jim Keller MD Taking Active   lisinopril 5 mg tablet [id number] Yes TAKE 1 TABLET BY MOUTH EVERY DAY Jim Keller MD Taking Active   magnesium oxide (Mag-Ox) 400 mg (241.3 mg magnesium) tablet [id number] Yes TAKE 1 TABLET BY MOUTH TWICE A DAY Aruna Mireles MD Taking Active   metoprolol succinate XL (Toprol-XL) 100 mg 24 hr tablet [id number] Yes TAKE 1 TABLET BY MOUTH EVERY DAY Aruna Mireles MD Taking Active   multivitamin tablet [id number] Yes Take 1 tablet by mouth once daily. Historical Provider, MD Taking Active " "  polyethylene glycol (Miralax) 17 gram/dose powder [id number] No Take 17 g by mouth once daily as needed. Historical Provider, MD Not Taking Active                   Assessment/Plan    1) pulmonary embolus  -he was sent for a CT angio of his chest on 1/12/2024 which showed incidental finding of filling defect within a subsegmental right upper lobar pulmonary artery  -he was started on eliquis  -he recalls feeling mildly short of breath for the last few months  -he had a viral syndrome in December 2023, though his Covid test was negative, he says he was pretty sure he had Covid  -he underwent right knee total replacement surgery on 11/14/2022  -he was sent for a doppler of his legs on 1/31/2024 showing on the right no evidence of acute DVT in right leg; there are chronic changes visualized in the mid femoral and popliteal veins; cystic structure in the popliteal fossa; on the left--no evidence of acute DVT in left leg; cystic structure in popliteal fossa  -since the doppler showed \"chronic\" vascular changes, the presumption is that he may have thrown a DVT after his right knee replacement surgery  -Covid infection, prior VTE orthopedic surgeries, and advanced aga are all risk factors for VTE  -he has now completed a 3 month course of full dose elquis  -he says he is still dealing with arrhythmia  -here to review new imaging--doppler of legs done on 4/8/2024 showed no evidence of acute DVT in the left leg; in his right leg there is reflux in the popliteal vein; no evidence of acute DVT, there are chronic changes seen in the popliteal vein  -lung perfusion study showed no new wedge-shaped subsegmental or segmental perfusion defect to suggest acute PE; a wedge shaped subsegmental perfusion defect in right upper lobe likely corresponding to abnormal findings seen on prior CTA likely representing a chronic pulmonary embolism  -given that he also has arrhythmia/atrial fibrillation, I have recommended that he stay on eliquis " at full dose-- 5 mg BID; he says his copay per month is only 10 dollars  -he would like be screened for inheritable thrombophilia--we cannot do that while he is actively anticoagulated; he did pause eliquis for 2 days and then labwork was drawn  -here for interval followup via telephone to review results  -factor V Leiden negative  -prothrombin gene mutation POSITIVE (heterozygote)  -no evidence of antithrombin deficiency  -no evidence of protein C deficiency  -no evidence of protein S deficiency  -no evidence of lupus anticoagulant  -I have advised him to continue with eliquis 5 mg BID--he has to be on the same anyway as stroke prophylaxis in the setting of arrhythmia  -he has 2 adult sons who may wish to be screened  -he may return PRN, as in the future if he were to need elective surgery, the surgeon may request hematology clearance     2) hypothyroidism  -on levothyroxine     3) hypertension  -on lisinopril     4) PVCs  -has history of ventricular tachycardia  -s/p ablation of slow pathway of AV node  -on metoprolol     5) aortic root aneurysm  -CT angio of the chest done on 1/12/2024 showed no evidence for acute aortic pathology, such as dissection, intramural hematoma or contained rupture       Problem List Items Addressed This Visit    None           Filipe Hernandez MD

## 2024-05-09 ENCOUNTER — OFFICE VISIT (OUTPATIENT)
Dept: PRIMARY CARE | Facility: CLINIC | Age: 69
End: 2024-05-09
Payer: COMMERCIAL

## 2024-05-09 VITALS
DIASTOLIC BLOOD PRESSURE: 76 MMHG | HEIGHT: 70 IN | BODY MASS INDEX: 39.94 KG/M2 | SYSTOLIC BLOOD PRESSURE: 124 MMHG | HEART RATE: 76 BPM | TEMPERATURE: 97 F | WEIGHT: 279 LBS

## 2024-05-09 DIAGNOSIS — I26.93 SINGLE SUBSEGMENTAL PULMONARY EMBOLISM WITHOUT ACUTE COR PULMONALE (MULTI): ICD-10-CM

## 2024-05-09 DIAGNOSIS — I27.82 OTHER CHRONIC PULMONARY EMBOLISM, UNSPECIFIED WHETHER ACUTE COR PULMONALE PRESENT (MULTI): ICD-10-CM

## 2024-05-09 DIAGNOSIS — Z87.891 FORMER SMOKER: ICD-10-CM

## 2024-05-09 DIAGNOSIS — E66.01 MORBID OBESITY WITH BMI OF 40.0-44.9, ADULT (MULTI): ICD-10-CM

## 2024-05-09 PROCEDURE — 1157F ADVNC CARE PLAN IN RCRD: CPT | Performed by: FAMILY MEDICINE

## 2024-05-09 PROCEDURE — 1160F RVW MEDS BY RX/DR IN RCRD: CPT | Performed by: FAMILY MEDICINE

## 2024-05-09 PROCEDURE — 1159F MED LIST DOCD IN RCRD: CPT | Performed by: FAMILY MEDICINE

## 2024-05-09 PROCEDURE — 3078F DIAST BP <80 MM HG: CPT | Performed by: FAMILY MEDICINE

## 2024-05-09 PROCEDURE — 3008F BODY MASS INDEX DOCD: CPT | Performed by: FAMILY MEDICINE

## 2024-05-09 PROCEDURE — 99213 OFFICE O/P EST LOW 20 MIN: CPT | Performed by: FAMILY MEDICINE

## 2024-05-09 PROCEDURE — 3074F SYST BP LT 130 MM HG: CPT | Performed by: FAMILY MEDICINE

## 2024-05-09 ASSESSMENT — PATIENT HEALTH QUESTIONNAIRE - PHQ9
1. LITTLE INTEREST OR PLEASURE IN DOING THINGS: NOT AT ALL
2. FEELING DOWN, DEPRESSED OR HOPELESS: NOT AT ALL
SUM OF ALL RESPONSES TO PHQ9 QUESTIONS 1 AND 2: 0

## 2024-05-09 ASSESSMENT — ENCOUNTER SYMPTOMS
ALLERGIC/IMMUNOLOGIC NEGATIVE: 1
ENDOCRINE NEGATIVE: 1
EYES NEGATIVE: 1
MUSCULOSKELETAL NEGATIVE: 1
CARDIOVASCULAR NEGATIVE: 1
RESPIRATORY NEGATIVE: 1
HEMATOLOGIC/LYMPHATIC NEGATIVE: 1
CONSTITUTIONAL NEGATIVE: 1
PSYCHIATRIC NEGATIVE: 1
GASTROINTESTINAL NEGATIVE: 1

## 2024-05-09 NOTE — PROGRESS NOTES
Subjective the patient is here for follow-up on his right upper lobe pulmonary embolus.  He states that he has been feeling well.  He has no chest pain or shortness of breath or leg swelling.  He saw Dr. Hernandez for hematology evaluation and apparently is heterozygote for a prothrombin gene.  Dr. Hernandez recommended that the patient remain on Eliquis indefinitely also because of his need for atrial fibrillation.  He continues on his other meds noted.  He has no other complaints    Patient ID: Julio White is a 68 y.o. male who presents for Follow-up (On pulmonary embolism):    Problem List Items Addressed This Visit    None     Past Medical History:   Diagnosis Date   • Body mass index (BMI) 37.0-37.9, adult 11/01/2021    BMI 37.0-37.9, adult   • Obesity, unspecified 08/10/2022    Class 2 obesity with body mass index (BMI) of 38.0 to 38.9 in adult   • Other conditions influencing health status 11/01/2021    Patient new to provider   • Personal history of other diseases of the circulatory system 08/04/2022    History of blood pressure problems   • Personal history of other diseases of the respiratory system 10/19/2022    History of acute sinusitis   • Personal history of other endocrine, nutritional and metabolic disease 11/01/2021    History of obesity      Past Surgical History:   Procedure Laterality Date   • OTHER SURGICAL HISTORY  10/05/2021    Hip surgery   • OTHER SURGICAL HISTORY  10/05/2021    Thyroid surgery   • OTHER SURGICAL HISTORY  09/26/2022    Colonoscopy   • TOTAL KNEE ARTHROPLASTY Right 11/2022      Family History   Problem Relation Name Age of Onset   • Cancer Mother     • Other (cardiac disorder) Father        Social History     Socioeconomic History   • Marital status:      Spouse name: Not on file   • Number of children: Not on file   • Years of education: Not on file   • Highest education level: Not on file   Occupational History   • Not on file   Tobacco Use   • Smoking status: Former      Current packs/day: 0.00     Types: Cigarettes     Quit date:      Years since quittin.3   • Smokeless tobacco: Never   Substance and Sexual Activity   • Alcohol use: Yes     Alcohol/week: 12.0 standard drinks of alcohol     Types: 12 Cans of beer per week   • Drug use: Not Currently   • Sexual activity: Not on file   Other Topics Concern   • Not on file   Social History Narrative   • Not on file     Social Determinants of Health     Financial Resource Strain: Not on file   Food Insecurity: Not on file   Transportation Needs: Not on file   Physical Activity: Not on file   Stress: Not on file   Social Connections: Not on file   Intimate Partner Violence: Not on file   Housing Stability: Not on file      Patient has no known allergies.   Current Outpatient Medications   Medication Sig Dispense Refill   • apixaban (Eliquis) 5 mg tablet Take 1 tablet (5 mg) by mouth 2 times a day. 180 tablet 0   • clindamycin (Cleocin T) 1 % external solution 1 Application.     • clindamycin (Cleocin T) 1 % lotion Clindamycin Phosphate 1 % External Lotion   Refills: 0       Active     • levothyroxine (Synthroid, Levoxyl) 125 mcg tablet TAKE 1 TABLET BY MOUTH EVERY DAY 90 tablet 1   • lisinopril 5 mg tablet TAKE 1 TABLET BY MOUTH EVERY DAY 90 tablet 3   • magnesium oxide (Mag-Ox) 400 mg (241.3 mg magnesium) tablet TAKE 1 TABLET BY MOUTH TWICE A  tablet 0   • metoprolol succinate XL (Toprol-XL) 100 mg 24 hr tablet TAKE 1 TABLET BY MOUTH EVERY DAY 90 tablet 3   • multivitamin tablet Take 1 tablet by mouth once daily.     • polyethylene glycol (Miralax) 17 gram/dose powder Take 17 g by mouth once daily as needed.     • aspirin 81 mg chewable tablet Chew 1 tablet (81 mg) once daily. resume after you complete course of Aspirin 81mg Bid X 30 days, to help reduce your risk for blood clots     • diazePAM (Valium) 5 mg tablet Take 1 tablet (5 mg) by mouth once daily as needed.     • esomeprazole (NexIUM) 20 mg DR capsule TAKE 1  CAPSULE BY MOUTH EVERY DAY (Patient not taking: Reported on 5/9/2024) 90 capsule 1     No current facility-administered medications for this visit.       Immunization History   Administered Date(s) Administered   • Flu vaccine (IIV4), preservative free *Check age/dose* 09/30/2018, 10/01/2019, 10/07/2022   • Influenza, High Dose Seasonal, Preservative Free 10/19/2021   • Influenza, Unspecified 10/29/2009, 10/25/2011, 10/01/2014, 10/01/2015, 10/01/2016, 10/01/2021   • Influenza, seasonal, injectable 10/01/2020, 10/01/2023   • Moderna SARS-CoV-2 Vaccination 12/31/2020, 02/11/2021, 02/28/2021, 11/19/2021   • Novel influenza-H1N1-09, preservative-free 11/05/2009, 10/07/2022   • Pfizer Purple Cap SARS-CoV-2 09/20/2022   • Pneumococcal Conjugate PCV 7 1955   • Pneumococcal conjugate vaccine, 13-valent (PREVNAR 13) 10/19/2021   • Pneumococcal polysaccharide vaccine, 23-valent, age 2 years and older (PNEUMOVAX 23) 10/27/2022        Review of Systems   Constitutional: Negative.    HENT: Negative.     Eyes: Negative.    Respiratory: Negative.     Cardiovascular: Negative.    Gastrointestinal: Negative.    Endocrine: Negative.    Genitourinary: Negative.    Musculoskeletal: Negative.    Skin: Negative.    Allergic/Immunologic: Negative.    Hematological: Negative.    Psychiatric/Behavioral: Negative.     All other systems reviewed and are negative.       Vitals:    05/09/24 1454   BP: 124/76   Pulse: 76   Temp: 36.1 °C (97 °F)     Vitals:    05/09/24 1454   Weight: 127 kg (279 lb)      Physical Exam  Constitutional:       General: He is not in acute distress.     Appearance: Normal appearance.   Cardiovascular:      Rate and Rhythm: Normal rate. Rhythm irregular.      Pulses: Normal pulses.      Heart sounds: Normal heart sounds. No murmur heard.     No friction rub. No gallop.   Pulmonary:      Effort: Pulmonary effort is normal. No respiratory distress.      Breath sounds: Normal breath sounds. No wheezing or rales.    Neurological:      Mental Status: He is alert.   Heart-irregular irregular S1-S2 without murmur.    ASSESSMENT/PLAN: Right subsegmental pulmonary embolus.  Continue Eliquis 5 mg twice daily.  Call for any shortness of breath or leg swelling.  Check CBC CMP lipid profile and TSH as previously ordered.    Hypertension stable.  Continue current meds    Hyperlipidemia check lipid profile  Hypothyroid.  Check TSH    Atrial fibrillation managed by electrophysiology.    Follow-up in 6 months and call as needed     Scribe Attestation  By signing my name below, I, Salma Camara LPN, Scribe   attest that this documentation has been prepared under the direction and in the presence of Jim Keller MD.

## 2024-06-19 DIAGNOSIS — E03.9 HYPOTHYROIDISM, UNSPECIFIED: ICD-10-CM

## 2024-06-20 DIAGNOSIS — I26.93 SINGLE SUBSEGMENTAL PULMONARY EMBOLISM WITHOUT ACUTE COR PULMONALE (MULTI): ICD-10-CM

## 2024-06-20 RX ORDER — LEVOTHYROXINE SODIUM 125 UG/1
125 TABLET ORAL DAILY
Qty: 90 TABLET | Refills: 1 | Status: SHIPPED | OUTPATIENT
Start: 2024-06-20

## 2024-07-25 DIAGNOSIS — M19.90 UNSPECIFIED OSTEOARTHRITIS, UNSPECIFIED SITE: ICD-10-CM

## 2024-07-26 RX ORDER — CELECOXIB 200 MG/1
200 CAPSULE ORAL DAILY PRN
Refills: 0 | OUTPATIENT
Start: 2024-07-26

## 2024-08-05 ENCOUNTER — OFFICE VISIT (OUTPATIENT)
Dept: PRIMARY CARE | Facility: CLINIC | Age: 69
End: 2024-08-05
Payer: COMMERCIAL

## 2024-08-05 VITALS
TEMPERATURE: 97.7 F | WEIGHT: 281 LBS | HEART RATE: 74 BPM | HEIGHT: 70 IN | DIASTOLIC BLOOD PRESSURE: 65 MMHG | SYSTOLIC BLOOD PRESSURE: 98 MMHG | BODY MASS INDEX: 40.23 KG/M2

## 2024-08-05 DIAGNOSIS — L08.9 INFECTED SEBACEOUS CYST: Primary | ICD-10-CM

## 2024-08-05 DIAGNOSIS — L02.91 ABSCESS: ICD-10-CM

## 2024-08-05 DIAGNOSIS — Z87.891 FORMER SMOKER: ICD-10-CM

## 2024-08-05 DIAGNOSIS — E66.01 MORBID OBESITY WITH BMI OF 40.0-44.9, ADULT (MULTI): ICD-10-CM

## 2024-08-05 DIAGNOSIS — L72.3 INFECTED SEBACEOUS CYST: Primary | ICD-10-CM

## 2024-08-05 PROCEDURE — 1159F MED LIST DOCD IN RCRD: CPT | Performed by: FAMILY MEDICINE

## 2024-08-05 PROCEDURE — 3078F DIAST BP <80 MM HG: CPT | Performed by: FAMILY MEDICINE

## 2024-08-05 PROCEDURE — 1036F TOBACCO NON-USER: CPT | Performed by: FAMILY MEDICINE

## 2024-08-05 PROCEDURE — 3074F SYST BP LT 130 MM HG: CPT | Performed by: FAMILY MEDICINE

## 2024-08-05 PROCEDURE — 99213 OFFICE O/P EST LOW 20 MIN: CPT | Performed by: FAMILY MEDICINE

## 2024-08-05 PROCEDURE — 1160F RVW MEDS BY RX/DR IN RCRD: CPT | Performed by: FAMILY MEDICINE

## 2024-08-05 PROCEDURE — 1157F ADVNC CARE PLAN IN RCRD: CPT | Performed by: FAMILY MEDICINE

## 2024-08-05 PROCEDURE — 1158F ADVNC CARE PLAN TLK DOCD: CPT | Performed by: FAMILY MEDICINE

## 2024-08-05 PROCEDURE — 1123F ACP DISCUSS/DSCN MKR DOCD: CPT | Performed by: FAMILY MEDICINE

## 2024-08-05 PROCEDURE — 3008F BODY MASS INDEX DOCD: CPT | Performed by: FAMILY MEDICINE

## 2024-08-05 RX ORDER — CEPHALEXIN 500 MG/1
500 CAPSULE ORAL 3 TIMES DAILY
Qty: 30 CAPSULE | Refills: 0 | Status: SHIPPED | OUTPATIENT
Start: 2024-08-05 | End: 2024-08-15

## 2024-08-05 RX ORDER — DOXYCYCLINE 100 MG/1
100 CAPSULE ORAL 2 TIMES DAILY
Qty: 20 CAPSULE | Refills: 0 | Status: SHIPPED | OUTPATIENT
Start: 2024-08-05 | End: 2024-08-15

## 2024-08-05 ASSESSMENT — ENCOUNTER SYMPTOMS
PSYCHIATRIC NEGATIVE: 1
CONSTITUTIONAL NEGATIVE: 1
ENDOCRINE NEGATIVE: 1
RESPIRATORY NEGATIVE: 1
EYES NEGATIVE: 1
HEMATOLOGIC/LYMPHATIC NEGATIVE: 1
ALLERGIC/IMMUNOLOGIC NEGATIVE: 1
CARDIOVASCULAR NEGATIVE: 1
MUSCULOSKELETAL NEGATIVE: 1
GASTROINTESTINAL NEGATIVE: 1

## 2024-08-05 ASSESSMENT — PATIENT HEALTH QUESTIONNAIRE - PHQ9
2. FEELING DOWN, DEPRESSED OR HOPELESS: NOT AT ALL
SUM OF ALL RESPONSES TO PHQ9 QUESTIONS 1 AND 2: 0
1. LITTLE INTEREST OR PLEASURE IN DOING THINGS: NOT AT ALL

## 2024-08-05 NOTE — PATIENT INSTRUCTIONS
Begin cephalexin 500 mg three times daily  Begin doxycycline 100 mg twice daily  Begin OTC align daily

## 2024-08-05 NOTE — PROGRESS NOTES
Isrrael Liset is here on an acute basis for a cyst on the back of his right upper arm that he has had for about 2 days now.  The redness seems to be worsening.  And it is becoming more painful.  He has a history of recurrent sebaceous cysts in the past.  He denies fevers or chills.  He continues on his meds noted.    Patient ID: Julio White is a 68 y.o. male who presents for Arm Pain (Patient has a cyst on the back his right upper arm and it is painful.  He noticed it 2 days ago.):    Problem List Items Addressed This Visit    None     Past Medical History:   Diagnosis Date    Body mass index (BMI) 37.0-37.9, adult 2021    BMI 37.0-37.9, adult    Obesity, unspecified 08/10/2022    Class 2 obesity with body mass index (BMI) of 38.0 to 38.9 in adult    Other conditions influencing health status 2021    Patient new to provider    Personal history of other diseases of the circulatory system 2022    History of blood pressure problems    Personal history of other diseases of the respiratory system 10/19/2022    History of acute sinusitis    Personal history of other endocrine, nutritional and metabolic disease 2021    History of obesity      Past Surgical History:   Procedure Laterality Date    OTHER SURGICAL HISTORY  10/05/2021    Hip surgery    OTHER SURGICAL HISTORY  10/05/2021    Thyroid surgery    OTHER SURGICAL HISTORY  2022    Colonoscopy    TOTAL KNEE ARTHROPLASTY Right 2022      Family History   Problem Relation Name Age of Onset    Cancer Mother      Other (cardiac disorder) Father        Social History     Socioeconomic History    Marital status:      Spouse name: Not on file    Number of children: Not on file    Years of education: Not on file    Highest education level: Not on file   Occupational History    Not on file   Tobacco Use    Smoking status: Former     Current packs/day: 0.00     Types: Cigarettes     Quit date:      Years since quittin.5     Smokeless tobacco: Never   Substance and Sexual Activity    Alcohol use: Yes     Alcohol/week: 12.0 standard drinks of alcohol     Types: 12 Cans of beer per week    Drug use: Not Currently    Sexual activity: Not on file   Other Topics Concern    Not on file   Social History Narrative    Not on file     Social Determinants of Health     Financial Resource Strain: Not on file   Food Insecurity: Not on file   Transportation Needs: Not on file   Physical Activity: Not on file   Stress: Not on file   Social Connections: Not on file   Intimate Partner Violence: Not on file   Housing Stability: Not on file      Patient has no known allergies.   Current Outpatient Medications   Medication Sig Dispense Refill    apixaban (Eliquis) 5 mg tablet Take 1 tablet (5 mg) by mouth 2 times a day. 60 tablet 11    clindamycin (Cleocin T) 1 % external solution 1 Application.      levothyroxine (Synthroid, Levoxyl) 125 mcg tablet TAKE 1 TABLET BY MOUTH EVERY DAY 90 tablet 1    lisinopril 5 mg tablet TAKE 1 TABLET BY MOUTH EVERY DAY 90 tablet 3    magnesium oxide (Mag-Ox) 400 mg (241.3 mg magnesium) tablet TAKE 1 TABLET BY MOUTH TWICE A  tablet 0    metoprolol succinate XL (Toprol-XL) 100 mg 24 hr tablet TAKE 1 TABLET BY MOUTH EVERY DAY 90 tablet 3    multivitamin tablet Take 1 tablet by mouth once daily.      polyethylene glycol (Miralax) 17 gram/dose powder Take 17 g by mouth once daily as needed.      aspirin 81 mg chewable tablet Chew 1 tablet (81 mg) once daily. resume after you complete course of Aspirin 81mg Bid X 30 days, to help reduce your risk for blood clots      clindamycin (Cleocin T) 1 % lotion Clindamycin Phosphate 1 % External Lotion   Refills: 0       Active      diazePAM (Valium) 5 mg tablet Take 1 tablet (5 mg) by mouth once daily as needed.      esomeprazole (NexIUM) 20 mg DR capsule TAKE 1 CAPSULE BY MOUTH EVERY DAY (Patient not taking: Reported on 5/9/2024) 90 capsule 1     No current facility-administered  medications for this visit.       Immunization History   Administered Date(s) Administered    Flu vaccine (IIV4), preservative free *Check age/dose* 09/30/2018, 10/01/2019, 10/07/2022    Influenza, High Dose Seasonal, Preservative Free 10/19/2021    Influenza, Unspecified 10/29/2009, 10/25/2011, 10/01/2014, 10/01/2015, 10/01/2016, 10/01/2021    Influenza, seasonal, injectable 10/01/2020, 10/01/2023    Moderna SARS-CoV-2 Vaccination 12/31/2020, 02/11/2021, 02/28/2021, 11/19/2021    Novel influenza-H1N1-09, preservative-free 11/05/2009, 10/07/2022    Pfizer Purple Cap SARS-CoV-2 09/20/2022    Pneumococcal Conjugate PCV 7 1955    Pneumococcal conjugate vaccine, 13-valent (PREVNAR 13) 10/19/2021    Pneumococcal polysaccharide vaccine, 23-valent, age 2 years and older (PNEUMOVAX 23) 10/27/2022        Review of Systems   Constitutional: Negative.    HENT: Negative.     Eyes: Negative.    Respiratory: Negative.     Cardiovascular: Negative.    Gastrointestinal: Negative.    Endocrine: Negative.    Genitourinary: Negative.    Musculoskeletal: Negative.    Skin:  Positive for rash.   Allergic/Immunologic: Negative.    Hematological: Negative.    Psychiatric/Behavioral: Negative.          Vitals:    08/05/24 1318   BP: 98/65   Pulse: 74   Temp: 36.5 °C (97.7 °F)     Vitals:    08/05/24 1318   Weight: 127 kg (281 lb)      Physical Exam  Constitutional:       General: He is not in acute distress.     Appearance: Normal appearance.   Neurological:      Mental Status: He is alert.     Anterior upper arm near the shoulder-there is a dime sized area of bright redness with a central pustule that is raised.  Surrounding this especially inferiorly there is less intense skin redness but induration.  Below this there is a faint area of redness extending several centimeters into the upper arm.  There is no drainage noted.  Arm range of motion is full.    ASSESSMENT/PLAN: Infected sebaceous cyst right upper arm.  Begin Keflex 500 mg  3 times daily for 10 days and doxycycline 100 mg twice daily for 10 days.  Apply warm compresses daily.  Rest as needed.  Call if not improving in the next 4 to 5 days.  Call at once for any worsening redness or swelling.  Follow-up as scheduled and call as needed         Scribe Attestation  By signing my name below, I, Salma Camara LPN, Scribe   attest that this documentation has been prepared under the direction and in the presence of Jim Keller MD.

## 2024-10-31 ENCOUNTER — APPOINTMENT (OUTPATIENT)
Dept: CARDIOLOGY | Facility: CLINIC | Age: 69
End: 2024-10-31
Payer: COMMERCIAL

## 2024-11-11 ENCOUNTER — APPOINTMENT (OUTPATIENT)
Dept: PRIMARY CARE | Facility: CLINIC | Age: 69
End: 2024-11-11
Payer: COMMERCIAL

## 2024-11-11 ENCOUNTER — OFFICE VISIT (OUTPATIENT)
Dept: CARDIOLOGY | Facility: CLINIC | Age: 69
End: 2024-11-11
Payer: COMMERCIAL

## 2024-11-11 VITALS
WEIGHT: 278 LBS | TEMPERATURE: 97.7 F | DIASTOLIC BLOOD PRESSURE: 72 MMHG | HEART RATE: 52 BPM | SYSTOLIC BLOOD PRESSURE: 110 MMHG | HEIGHT: 71 IN | BODY MASS INDEX: 38.92 KG/M2

## 2024-11-11 VITALS
HEIGHT: 70 IN | WEIGHT: 278 LBS | BODY MASS INDEX: 39.8 KG/M2 | SYSTOLIC BLOOD PRESSURE: 110 MMHG | DIASTOLIC BLOOD PRESSURE: 72 MMHG | HEART RATE: 52 BPM

## 2024-11-11 DIAGNOSIS — E03.9 ACQUIRED HYPOTHYROIDISM: ICD-10-CM

## 2024-11-11 DIAGNOSIS — I49.3 PVC (PREMATURE VENTRICULAR CONTRACTION): ICD-10-CM

## 2024-11-11 DIAGNOSIS — Q25.43 AORTIC ROOT ANEURYSM: Primary | ICD-10-CM

## 2024-11-11 DIAGNOSIS — Z87.891 FORMER SMOKER: ICD-10-CM

## 2024-11-11 DIAGNOSIS — I35.1 NONRHEUMATIC AORTIC VALVE INSUFFICIENCY: ICD-10-CM

## 2024-11-11 DIAGNOSIS — R73.01 IFG (IMPAIRED FASTING GLUCOSE): ICD-10-CM

## 2024-11-11 DIAGNOSIS — I10 HYPERTENSION, ESSENTIAL, BENIGN: ICD-10-CM

## 2024-11-11 DIAGNOSIS — E66.812 CLASS 2 OBESITY WITH BODY MASS INDEX (BMI) OF 38.0 TO 38.9 IN ADULT, UNSPECIFIED OBESITY TYPE, UNSPECIFIED WHETHER SERIOUS COMORBIDITY PRESENT: ICD-10-CM

## 2024-11-11 DIAGNOSIS — D68.52 PROTHROMBIN GENE MUTATION (MULTI): ICD-10-CM

## 2024-11-11 DIAGNOSIS — Z00.00 MEDICARE ANNUAL WELLNESS VISIT, SUBSEQUENT: ICD-10-CM

## 2024-11-11 PROBLEM — I47.19 PAROXYSMAL ATRIAL TACHYCARDIA (CMS-HCC): Status: ACTIVE | Noted: 2024-11-11

## 2024-11-11 PROCEDURE — 1157F ADVNC CARE PLAN IN RCRD: CPT | Performed by: INTERNAL MEDICINE

## 2024-11-11 PROCEDURE — 1159F MED LIST DOCD IN RCRD: CPT | Performed by: FAMILY MEDICINE

## 2024-11-11 PROCEDURE — 1157F ADVNC CARE PLAN IN RCRD: CPT | Performed by: FAMILY MEDICINE

## 2024-11-11 PROCEDURE — G0439 PPPS, SUBSEQ VISIT: HCPCS | Performed by: FAMILY MEDICINE

## 2024-11-11 PROCEDURE — 3008F BODY MASS INDEX DOCD: CPT | Performed by: FAMILY MEDICINE

## 2024-11-11 PROCEDURE — 1160F RVW MEDS BY RX/DR IN RCRD: CPT | Performed by: INTERNAL MEDICINE

## 2024-11-11 PROCEDURE — 3074F SYST BP LT 130 MM HG: CPT | Performed by: FAMILY MEDICINE

## 2024-11-11 PROCEDURE — 1123F ACP DISCUSS/DSCN MKR DOCD: CPT | Performed by: INTERNAL MEDICINE

## 2024-11-11 PROCEDURE — 3008F BODY MASS INDEX DOCD: CPT | Performed by: INTERNAL MEDICINE

## 2024-11-11 PROCEDURE — 3078F DIAST BP <80 MM HG: CPT | Performed by: INTERNAL MEDICINE

## 2024-11-11 PROCEDURE — 93000 ELECTROCARDIOGRAM COMPLETE: CPT | Performed by: INTERNAL MEDICINE

## 2024-11-11 PROCEDURE — 1123F ACP DISCUSS/DSCN MKR DOCD: CPT | Performed by: FAMILY MEDICINE

## 2024-11-11 PROCEDURE — 1036F TOBACCO NON-USER: CPT | Performed by: INTERNAL MEDICINE

## 2024-11-11 PROCEDURE — 1160F RVW MEDS BY RX/DR IN RCRD: CPT | Performed by: FAMILY MEDICINE

## 2024-11-11 PROCEDURE — 1036F TOBACCO NON-USER: CPT | Performed by: FAMILY MEDICINE

## 2024-11-11 PROCEDURE — 99214 OFFICE O/P EST MOD 30 MIN: CPT | Performed by: INTERNAL MEDICINE

## 2024-11-11 PROCEDURE — 3078F DIAST BP <80 MM HG: CPT | Performed by: FAMILY MEDICINE

## 2024-11-11 PROCEDURE — 1158F ADVNC CARE PLAN TLK DOCD: CPT | Performed by: FAMILY MEDICINE

## 2024-11-11 PROCEDURE — 3074F SYST BP LT 130 MM HG: CPT | Performed by: INTERNAL MEDICINE

## 2024-11-11 PROCEDURE — 1159F MED LIST DOCD IN RCRD: CPT | Performed by: INTERNAL MEDICINE

## 2024-11-11 ASSESSMENT — ENCOUNTER SYMPTOMS
PALPITATIONS: 1
ARTHRALGIAS: 1
NEUROLOGICAL NEGATIVE: 1
CONSTITUTIONAL NEGATIVE: 1

## 2024-11-11 NOTE — PROGRESS NOTES
Patient:  Julio White  YOB: 1955  MRN: [id number]       Chief Complaint/Active Symptoms:       Julio White is a 69 y.o. male who returns today for cardiac follow-up.    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 Last evaluation by CTA of chest in January of this year. Has no awareness of rapid heart rate. Has low heart rate alarms - he feels it is counting the extra beats at that time.     No chest pain or discomfort. No palpitations. No shortness of breath with routine activities. No orthopnea or PND. No edema.       Review of Systems   Constitutional: Negative.    Cardiovascular:  Positive for palpitations. Negative for chest pain and leg swelling.   Musculoskeletal:  Positive for arthralgias.   Neurological: Negative.    All other systems reviewed and are negative.      Objective:     Vitals:    11/11/24 1453   BP: 110/72   Pulse: 52       Vitals:    11/11/24 1453   Weight: 126 kg (278 lb)     Allergies:     No Known Allergies     Medications:     Current Outpatient Medications   Medication Instructions    apixaban (ELIQUIS) 5 mg, oral, 2 times daily    aspirin 81 mg chewable tablet 1 tablet, Daily    clindamycin (Cleocin T) 1 % external solution 1 Application    clindamycin (Cleocin T) 1 % lotion Clindamycin Phosphate 1 % External Lotion   Refills: 0       Active    diazePAM (Valium) 5 mg tablet 1 tablet, Daily PRN    esomeprazole (NEXIUM) 20 mg, oral, Daily    levothyroxine (SYNTHROID, LEVOXYL) 125 mcg, oral, Daily    lisinopril 5 mg, oral, Daily    magnesium oxide (MAG-OX) 400 mg, oral, 2 times daily    metoprolol succinate XL (TOPROL-XL) 100 mg, oral, Daily    multivitamin tablet 1 tablet, Daily    polyethylene glycol (MIRALAX) 17 g, Daily PRN       Physical Examination:   GENERAL:  Well developed, well nourished, in no acute distress.  HEENT: NC AT, EOMI with anicteric sclera  NECK:  Supple, no JVD, no bruit.  CHEST:  Symmetric and nontender.  LUNGS:  Clear to  "auscultation bilaterally, normal respiratory effort.  HEART: PMI is not palpable.  There is a regular rhythm occasionally irregular with a normal S1 and S2 without S3.  There is a soft systolic ejection murmur at the upper sternal border no diastolic murmurs appreciated even with Valsalva maneuver.  There are no carotid or abdominal bruits, pedal perfusion and pulses are normal.  There is trace pretibial and pedal edema with varicosities.    ABDOMEN: Soft, NT, ND without palpable organomegaly or bruits  EXTREMITIES:  Warm with good color, no clubbing or cyanosis.  There is trace edema noted.  PERIPHERAL VASCULAR:  Pulses present and equally palpable.  Venous varicosities present  MUSCULOSKELETAL: Osteoarthritic changes  NEURO/PSYCH:  Alert and oriented times three with approppriate behavior and responses. Nonfocal motor examination with normal gait and ambulation  Lymph: No significant palpable lymphadenopathy  Skin: no rash or lesions on exposed skin or reported.    Lab:     CBC:   Lab Results   Component Value Date    WBC 8.7 05/15/2023    RBC 4.92 05/15/2023    HGB 15.0 05/15/2023    HCT 46.4 05/15/2023     05/15/2023        CMP:    Lab Results   Component Value Date     01/04/2024    K 4.4 01/04/2024     01/04/2024    CO2 27 01/04/2024    BUN 16 01/04/2024    CREATININE 0.99 01/04/2024    GLUCOSE 97 01/04/2024    CALCIUM 8.8 01/04/2024       Magnesium:    No results found for: \"MG\"    Lipid Profile:    Lab Results   Component Value Date    TRIG 90 05/15/2023    HDL 55.5 05/15/2023       TSH:    Lab Results   Component Value Date    TSH 1.98 05/15/2023       BNP:   No results found for: \"BNP\"     PT/INR:    Lab Results   Component Value Date    PROTIME 10.2 02/17/2022    INR 0.9 02/17/2022       HgBA1c:    Lab Results   Component Value Date    HGBA1C 5.6 01/04/2024       BMP:  Lab Results   Component Value Date     01/04/2024     05/15/2023     11/16/2022     11/15/2022 " "   K 4.4 01/04/2024    K 4.5 05/15/2023    K 3.9 11/16/2022    K 4.2 11/15/2022     01/04/2024     05/15/2023     11/16/2022     11/15/2022    CO2 27 01/04/2024    CO2 25 05/15/2023    CO2 25 11/16/2022    CO2 25 11/15/2022    BUN 16 01/04/2024    BUN 17 05/15/2023    BUN 15 11/16/2022    BUN 21 11/15/2022    CREATININE 0.99 01/04/2024    CREATININE 0.90 05/15/2023    CREATININE 0.76 11/16/2022    CREATININE 1.00 11/15/2022       Cardiac Enzymes:    No results found for: \"TROPHS\"    Hepatic Function Panel:    Lab Results   Component Value Date    ALKPHOS 76 01/04/2024    ALT 23 01/04/2024    AST 16 01/04/2024    PROT 6.6 01/04/2024    BILITOT 0.5 01/04/2024       Diagnostic Studies:     EKG:   EKG today SR with first degree av block with frequent unifocal PVCs    Radiology:     No orders to display     ASSESSMENT     Problem List Items Addressed This Visit       PVC (premature ventricular contraction)    Hypertension, essential, benign    Relevant Orders    ECG 12 lead (Clinic Performed) (Completed)    Aortic root aneurysm - Primary    Relevant Orders    Transthoracic Echo Complete    Prothrombin gene mutation (Multi)     Other Visit Diagnoses       Nonrheumatic aortic valve insufficiency        Relevant Orders    Transthoracic Echo Complete            PLAN     1.  Aortic root aneurysm.  Evaluated in January of this year without any significant change in size.  Is moderately dilated.  There was trace to mild aortic insufficiency by echo a few years ago.  Will check an echocardiogram and see if there has been any significant change.  With good blood pressure and heart rate control would recommend a repeat either CT or MR angiography in January 2026.  Heart rate and blood pressure control are important however if the patient develops significant aortic insufficiency bradycardia can actually become a problem so we will have to temper his heart rate slowing.  2.  Hypertension.  Blood pressures " are controlled on his current medical regimen.  3.  Frequent PVCs.  He has frequent unifocal PVCs follows with electrophysiology is asymptomatic and remains on beta-blocker therapy.  4.  History of SVT status post ablation as well as  atrial tachycardia.  Is not on anticoagulation for this indication.  5.  History of pulmonary embolism and prothrombin gene mutation.  The patient has positive genetic testing and is recommended he remain on oral anticoagulation.  This is managed by his primary care physician.    From a heart standpoint we have requested the echocardiogram.  If there is no concerning or significant aortic regurgitation we will see him in follow-up in a years time.  We will defer management of his arrhythmias to electrophysiology who is following him regularly

## 2024-11-11 NOTE — PROGRESS NOTES
Isrrael Tan is here for his annual wellness visit.  He states that he has been feeling well and has no current complaints.  He continues on his meds as noted.  He just saw Dr. 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  He denies any chest pain or shortness of breath.  Echocardiogram has been ordered.  He was advised by hematology to continue chronic use of anticoagulation and is on Eliquis 5 mg twice daily.    Patient ID: Julio White is a 69 y.o. male who presents for Medicare Annual Wellness Visit Subsequent (AWV ):    Problem List Items Addressed This Visit       Hypothyroidism    Hypertension, essential, benign    Medicare annual wellness visit, subsequent    IFG (impaired fasting glucose)    Former smoker     Other Visit Diagnoses       Class 2 obesity with body mass index (BMI) of 38.0 to 38.9 in adult, unspecified obesity type, unspecified whether serious comorbidity present               Past Medical History:   Diagnosis Date    Body mass index (BMI) 37.0-37.9, adult 11/01/2021    BMI 37.0-37.9, adult    Obesity, unspecified 08/10/2022    Class 2 obesity with body mass index (BMI) of 38.0 to 38.9 in adult    Other conditions influencing health status 11/01/2021    Patient new to provider    Personal history of other diseases of the circulatory system 08/04/2022    History of blood pressure problems    Personal history of other diseases of the respiratory system 10/19/2022    History of acute sinusitis    Personal history of other endocrine, nutritional and metabolic disease 11/01/2021    History of obesity      Past Surgical History:   Procedure Laterality Date    OTHER SURGICAL HISTORY  10/05/2021    Hip surgery    OTHER SURGICAL HISTORY  10/05/2021    Thyroid surgery    OTHER SURGICAL HISTORY  09/26/2022    Colonoscopy    TOTAL KNEE ARTHROPLASTY Right 11/2022      Family History   Problem Relation Name Age of Onset    Cancer Mother      Other (cardiac disorder)  Father        Social History     Socioeconomic History    Marital status:      Spouse name: Not on file    Number of children: Not on file    Years of education: Not on file    Highest education level: Not on file   Occupational History    Not on file   Tobacco Use    Smoking status: Former     Current packs/day: 0.00     Types: Cigarettes     Quit date:      Years since quittin.8    Smokeless tobacco: Never   Substance and Sexual Activity    Alcohol use: Yes     Alcohol/week: 12.0 standard drinks of alcohol     Types: 12 Cans of beer per week    Drug use: Not Currently    Sexual activity: Not on file   Other Topics Concern    Not on file   Social History Narrative    Not on file     Social Drivers of Health     Financial Resource Strain: Not on file   Food Insecurity: Not on file   Transportation Needs: Not on file   Physical Activity: Not on file   Stress: Not on file   Social Connections: Not on file   Intimate Partner Violence: Not on file   Housing Stability: Not on file      Patient has no known allergies.   Current Outpatient Medications   Medication Sig Dispense Refill    apixaban (Eliquis) 5 mg tablet Take 1 tablet (5 mg) by mouth 2 times a day. 60 tablet 11    clindamycin (Cleocin T) 1 % external solution 1 Application.      clindamycin (Cleocin T) 1 % lotion Clindamycin Phosphate 1 % External Lotion   Refills: 0       Active      levothyroxine (Synthroid, Levoxyl) 125 mcg tablet TAKE 1 TABLET BY MOUTH EVERY DAY 90 tablet 1    lisinopril 5 mg tablet TAKE 1 TABLET BY MOUTH EVERY DAY 90 tablet 3    magnesium oxide (Mag-Ox) 400 mg (241.3 mg magnesium) tablet TAKE 1 TABLET BY MOUTH TWICE A  tablet 0    metoprolol succinate XL (Toprol-XL) 100 mg 24 hr tablet TAKE 1 TABLET BY MOUTH EVERY DAY 90 tablet 3    multivitamin tablet Take 1 tablet by mouth once daily.      polyethylene glycol (Miralax) 17 gram/dose powder Mix 17 g of powder and drink once daily as needed.       No current  facility-administered medications for this visit.       Immunization History   Administered Date(s) Administered    Flu vaccine (IIV4), preservative free *Check age/dose* 09/30/2018, 10/01/2019, 10/07/2022    Flu vaccine, trivalent, preservative free, HIGH-DOSE, age 65y+ (Fluzone) 10/19/2021    Influenza, Unspecified 10/29/2009, 10/25/2011, 10/01/2014, 10/01/2015, 10/01/2016, 10/01/2021    Influenza, seasonal, injectable 10/01/2020, 10/01/2023    Moderna SARS-CoV-2 Vaccination 12/31/2020, 02/11/2021, 02/28/2021, 11/19/2021    Novel influenza-H1N1-09, preservative-free 11/05/2009, 10/07/2022    Pfizer Purple Cap SARS-CoV-2 09/20/2022    Pneumococcal Conjugate PCV 7 1955    Pneumococcal conjugate vaccine, 13-valent (PREVNAR 13) 10/19/2021    Pneumococcal polysaccharide vaccine, 23-valent, age 2 years and older (PNEUMOVAX 23) 10/27/2022        Review of Systems   Constitutional: Negative.    HENT: Negative.     Eyes: Negative.    Respiratory: Negative.     Cardiovascular: Negative.    Gastrointestinal: Negative.    Endocrine: Negative.    Genitourinary: Negative.    Musculoskeletal: Negative.    Skin: Negative.    Allergic/Immunologic: Negative.    Hematological: Negative.    Psychiatric/Behavioral: Negative.     All other systems reviewed and are negative.       Vitals:    11/11/24 1540   BP: 110/72   Pulse: 52   Temp: 36.5 °C (97.7 °F)     Vitals:    11/11/24 1540   Weight: 126 kg (278 lb)      Physical Exam  Constitutional:       General: He is not in acute distress.     Appearance: Normal appearance.   Neck:      Vascular: No carotid bruit.   Cardiovascular:      Rate and Rhythm: Normal rate and regular rhythm.      Pulses: Normal pulses.      Heart sounds: Normal heart sounds. No murmur heard.     No friction rub. No gallop.   Pulmonary:      Effort: Pulmonary effort is normal. No respiratory distress.      Breath sounds: Normal breath sounds. No wheezing or rales.   Abdominal:      General: Abdomen is flat.  There is no distension.      Palpations: Abdomen is soft. There is no mass.      Tenderness: There is no abdominal tenderness. There is no guarding.   Musculoskeletal:      Cervical back: Neck supple.   Neurological:      General: No focal deficit present.      Mental Status: He is alert and oriented to person, place, and time. Mental status is at baseline.   Psychiatric:         Mood and Affect: Mood normal.         Thought Content: Thought content normal.         Judgment: Judgment normal.          ASSESSMENT/PLAN: Annual wellness visit.  Check CBC CMP lipid profile PSA TSH and magnesium as had been ordered earlier this year.  Colonoscopy up-to-date.  Recommended shingles vaccination.  Exercise regularly.    Status post pulmonary embolism.  Continue Eliquis 5 mg twice daily.    Hypertension stable.    Hyperlipidemia.  Check lipid profile.    Hypothyroid.  Check TSH    Cardiac arrhythmia managed by cardiology and electrophysiology.    Aortic root aneurysm followed by cardiology    Follow-up 6 months and call as needed       Scribe Attestation  By signing my name below, I, Salma Camara LPN, Scribe   attest that this documentation has been prepared under the direction and in the presence of Jim Keller MD.

## 2024-11-11 NOTE — PATIENT INSTRUCTIONS
Get Echo  Follow-up in 1 year  Plan CT angiography vs MR angiography January 2026 if no symptoms.

## 2024-11-13 ASSESSMENT — ENCOUNTER SYMPTOMS
RESPIRATORY NEGATIVE: 1
GASTROINTESTINAL NEGATIVE: 1
MUSCULOSKELETAL NEGATIVE: 1
CONSTITUTIONAL NEGATIVE: 1
PSYCHIATRIC NEGATIVE: 1
ENDOCRINE NEGATIVE: 1
CARDIOVASCULAR NEGATIVE: 1
ALLERGIC/IMMUNOLOGIC NEGATIVE: 1
EYES NEGATIVE: 1
HEMATOLOGIC/LYMPHATIC NEGATIVE: 1

## 2024-11-23 DIAGNOSIS — I26.93 SINGLE SUBSEGMENTAL PULMONARY EMBOLISM WITHOUT ACUTE COR PULMONALE: ICD-10-CM

## 2024-11-25 ENCOUNTER — HOSPITAL ENCOUNTER (OUTPATIENT)
Dept: CARDIOLOGY | Facility: CLINIC | Age: 69
Discharge: HOME | End: 2024-11-25
Payer: COMMERCIAL

## 2024-11-25 ENCOUNTER — LAB (OUTPATIENT)
Dept: LAB | Facility: LAB | Age: 69
End: 2024-11-25
Payer: COMMERCIAL

## 2024-11-25 VITALS
SYSTOLIC BLOOD PRESSURE: 122 MMHG | WEIGHT: 278 LBS | HEIGHT: 71 IN | DIASTOLIC BLOOD PRESSURE: 80 MMHG | BODY MASS INDEX: 38.92 KG/M2

## 2024-11-25 DIAGNOSIS — I35.1 NONRHEUMATIC AORTIC VALVE INSUFFICIENCY: ICD-10-CM

## 2024-11-25 DIAGNOSIS — I10 HYPERTENSION, ESSENTIAL, BENIGN: ICD-10-CM

## 2024-11-25 DIAGNOSIS — E66.812 CLASS 2 OBESITY DUE TO EXCESS CALORIES WITH BODY MASS INDEX (BMI) OF 35.0 TO 35.9 IN ADULT, UNSPECIFIED WHETHER SERIOUS COMORBIDITY PRESENT: ICD-10-CM

## 2024-11-25 DIAGNOSIS — I27.82 OTHER CHRONIC PULMONARY EMBOLISM, UNSPECIFIED WHETHER ACUTE COR PULMONALE PRESENT (MULTI): ICD-10-CM

## 2024-11-25 DIAGNOSIS — Q25.49 OTHER CONGENITAL MALFORMATIONS OF AORTA: ICD-10-CM

## 2024-11-25 DIAGNOSIS — E66.09 CLASS 2 OBESITY DUE TO EXCESS CALORIES WITH BODY MASS INDEX (BMI) OF 35.0 TO 35.9 IN ADULT, UNSPECIFIED WHETHER SERIOUS COMORBIDITY PRESENT: ICD-10-CM

## 2024-11-25 DIAGNOSIS — Z12.5 PROSTATE CANCER SCREENING: ICD-10-CM

## 2024-11-25 DIAGNOSIS — Q25.43 AORTIC ROOT ANEURYSM: ICD-10-CM

## 2024-11-25 DIAGNOSIS — E03.9 ACQUIRED HYPOTHYROIDISM: ICD-10-CM

## 2024-11-25 LAB
ALBUMIN SERPL BCP-MCNC: 4.1 G/DL (ref 3.4–5)
ALP SERPL-CCNC: 63 U/L (ref 33–136)
ALT SERPL W P-5'-P-CCNC: 14 U/L (ref 10–52)
ANION GAP SERPL CALC-SCNC: 12 MMOL/L (ref 10–20)
AORTIC VALVE MEAN GRADIENT: 7 MMHG
AORTIC VALVE PEAK VELOCITY: 1.63 M/S
AST SERPL W P-5'-P-CCNC: 14 U/L (ref 9–39)
AV PEAK GRADIENT: 11 MMHG
AVA (PEAK VEL): 4.24 CM2
AVA (VTI): 3.89 CM2
BILIRUB SERPL-MCNC: 0.6 MG/DL (ref 0–1.2)
BUN SERPL-MCNC: 16 MG/DL (ref 6–23)
CALCIUM SERPL-MCNC: 8.9 MG/DL (ref 8.6–10.3)
CHLORIDE SERPL-SCNC: 103 MMOL/L (ref 98–107)
CHOLEST SERPL-MCNC: 166 MG/DL (ref 0–199)
CHOLESTEROL/HDL RATIO: 3.3
CO2 SERPL-SCNC: 28 MMOL/L (ref 21–32)
CREAT SERPL-MCNC: 1.15 MG/DL (ref 0.5–1.3)
EGFRCR SERPLBLD CKD-EPI 2021: 68 ML/MIN/1.73M*2
EJECTION FRACTION APICAL 4 CHAMBER: 56.9
EJECTION FRACTION: 58 %
ERYTHROCYTE [DISTWIDTH] IN BLOOD BY AUTOMATED COUNT: 13.2 % (ref 11.5–14.5)
GLUCOSE SERPL-MCNC: 105 MG/DL (ref 74–99)
HCT VFR BLD AUTO: 47.2 % (ref 41–52)
HDLC SERPL-MCNC: 50.8 MG/DL
HGB BLD-MCNC: 15.5 G/DL (ref 13.5–17.5)
LDLC SERPL CALC-MCNC: 92 MG/DL
LEFT ATRIUM VOLUME AREA LENGTH INDEX BSA: 29.7 ML/M2
LEFT VENTRICLE INTERNAL DIMENSION DIASTOLE: 5.71 CM (ref 3.5–6)
LEFT VENTRICULAR OUTFLOW TRACT DIAMETER: 2.48 CM
LV EJECTION FRACTION BIPLANE: 54 %
MAGNESIUM SERPL-MCNC: 2 MG/DL (ref 1.6–2.4)
MCH RBC QN AUTO: 31.6 PG (ref 26–34)
MCHC RBC AUTO-ENTMCNC: 32.8 G/DL (ref 32–36)
MCV RBC AUTO: 96 FL (ref 80–100)
MITRAL VALVE E/A RATIO: 0.86
NON HDL CHOLESTEROL: 115 MG/DL (ref 0–149)
NRBC BLD-RTO: 0 /100 WBCS (ref 0–0)
PLATELET # BLD AUTO: 241 X10*3/UL (ref 150–450)
POTASSIUM SERPL-SCNC: 4.6 MMOL/L (ref 3.5–5.3)
PROT SERPL-MCNC: 6.5 G/DL (ref 6.4–8.2)
PSA SERPL-MCNC: 2.32 NG/ML
RBC # BLD AUTO: 4.91 X10*6/UL (ref 4.5–5.9)
RIGHT VENTRICLE PEAK SYSTOLIC PRESSURE: 37 MMHG
SODIUM SERPL-SCNC: 138 MMOL/L (ref 136–145)
TRIGL SERPL-MCNC: 116 MG/DL (ref 0–149)
TSH SERPL-ACNC: 1.73 MIU/L (ref 0.44–3.98)
VLDL: 23 MG/DL (ref 0–40)
WBC # BLD AUTO: 9 X10*3/UL (ref 4.4–11.3)

## 2024-11-25 PROCEDURE — 83735 ASSAY OF MAGNESIUM: CPT

## 2024-11-25 PROCEDURE — 93306 TTE W/DOPPLER COMPLETE: CPT | Performed by: INTERNAL MEDICINE

## 2024-11-25 PROCEDURE — 80061 LIPID PANEL: CPT

## 2024-11-25 PROCEDURE — 80053 COMPREHEN METABOLIC PANEL: CPT

## 2024-11-25 PROCEDURE — 84153 ASSAY OF PSA TOTAL: CPT

## 2024-11-25 PROCEDURE — 93306 TTE W/DOPPLER COMPLETE: CPT

## 2024-11-25 PROCEDURE — 36415 COLL VENOUS BLD VENIPUNCTURE: CPT

## 2024-11-25 PROCEDURE — 85027 COMPLETE CBC AUTOMATED: CPT

## 2024-11-25 PROCEDURE — 84443 ASSAY THYROID STIM HORMONE: CPT

## 2024-11-26 ENCOUNTER — TELEPHONE (OUTPATIENT)
Dept: CARDIOLOGY | Facility: CLINIC | Age: 69
End: 2024-11-26
Payer: COMMERCIAL

## 2024-11-26 RX ORDER — APIXABAN 5 MG/1
5 TABLET, FILM COATED ORAL 2 TIMES DAILY
Qty: 180 TABLET | Refills: 1 | Status: SHIPPED | OUTPATIENT
Start: 2024-11-26

## 2024-11-26 NOTE — TELEPHONE ENCOUNTER
----- Message from Tracy Vergara sent at 11/25/2024 10:36 PM EST -----  Echo shows normal heart function. Some signs of elevated right heart pressures and high blood pressure effects on the heart. Make sure BP well controlled

## 2024-12-04 ENCOUNTER — TELEPHONE (OUTPATIENT)
Dept: PRIMARY CARE | Facility: CLINIC | Age: 69
End: 2024-12-04
Payer: COMMERCIAL

## 2024-12-04 DIAGNOSIS — L03.211 CELLULITIS OF FACE: Primary | ICD-10-CM

## 2024-12-04 RX ORDER — CEPHALEXIN 500 MG/1
500 CAPSULE ORAL 3 TIMES DAILY
Qty: 30 CAPSULE | Refills: 0 | Status: SHIPPED | OUTPATIENT
Start: 2024-12-04 | End: 2024-12-14

## 2024-12-04 NOTE — TELEPHONE ENCOUNTER
Patient said he gets infections on his cheek, right below the eye, every year.  Dr Keller just calls in a medication for that.    Patient is asking if you would call in the med in Dr Keller's absence, no available appointments this week

## 2024-12-10 DIAGNOSIS — E03.9 HYPOTHYROIDISM, UNSPECIFIED: ICD-10-CM

## 2024-12-10 DIAGNOSIS — I10 ESSENTIAL (PRIMARY) HYPERTENSION: ICD-10-CM

## 2024-12-10 DIAGNOSIS — R00.2 PALPITATIONS: ICD-10-CM

## 2024-12-12 RX ORDER — LEVOTHYROXINE SODIUM 125 UG/1
125 TABLET ORAL DAILY
Qty: 90 TABLET | Refills: 1 | Status: SHIPPED | OUTPATIENT
Start: 2024-12-12

## 2024-12-12 RX ORDER — LISINOPRIL 5 MG/1
5 TABLET ORAL DAILY
Qty: 90 TABLET | Refills: 1 | Status: SHIPPED | OUTPATIENT
Start: 2024-12-12

## 2025-01-21 ENCOUNTER — OFFICE VISIT (OUTPATIENT)
Dept: URGENT CARE | Age: 70
End: 2025-01-21
Payer: COMMERCIAL

## 2025-01-21 VITALS
WEIGHT: 278 LBS | OXYGEN SATURATION: 97 % | RESPIRATION RATE: 18 BRPM | DIASTOLIC BLOOD PRESSURE: 79 MMHG | SYSTOLIC BLOOD PRESSURE: 136 MMHG | TEMPERATURE: 97.6 F | HEIGHT: 71 IN | BODY MASS INDEX: 38.92 KG/M2 | HEART RATE: 63 BPM

## 2025-01-21 DIAGNOSIS — J06.9 VIRAL URI: Primary | ICD-10-CM

## 2025-01-21 DIAGNOSIS — R05.9 COUGH IN ADULT: ICD-10-CM

## 2025-01-21 LAB
POC RAPID INFLUENZA A: NEGATIVE
POC RAPID INFLUENZA B: NEGATIVE
POC SARS-COV-2 AG BINAX: NORMAL

## 2025-01-21 PROCEDURE — 1157F ADVNC CARE PLAN IN RCRD: CPT | Performed by: PHYSICIAN ASSISTANT

## 2025-01-21 PROCEDURE — 1159F MED LIST DOCD IN RCRD: CPT | Performed by: PHYSICIAN ASSISTANT

## 2025-01-21 PROCEDURE — 1123F ACP DISCUSS/DSCN MKR DOCD: CPT | Performed by: PHYSICIAN ASSISTANT

## 2025-01-21 PROCEDURE — 99203 OFFICE O/P NEW LOW 30 MIN: CPT | Performed by: PHYSICIAN ASSISTANT

## 2025-01-21 PROCEDURE — 87811 SARS-COV-2 COVID19 W/OPTIC: CPT | Performed by: PHYSICIAN ASSISTANT

## 2025-01-21 PROCEDURE — 87804 INFLUENZA ASSAY W/OPTIC: CPT | Performed by: PHYSICIAN ASSISTANT

## 2025-01-21 PROCEDURE — 3008F BODY MASS INDEX DOCD: CPT | Performed by: PHYSICIAN ASSISTANT

## 2025-01-21 PROCEDURE — 1036F TOBACCO NON-USER: CPT | Performed by: PHYSICIAN ASSISTANT

## 2025-01-21 PROCEDURE — 3075F SYST BP GE 130 - 139MM HG: CPT | Performed by: PHYSICIAN ASSISTANT

## 2025-01-21 PROCEDURE — 3078F DIAST BP <80 MM HG: CPT | Performed by: PHYSICIAN ASSISTANT

## 2025-01-21 ASSESSMENT — ENCOUNTER SYMPTOMS
ARTHRALGIAS: 0
EYE REDNESS: 0
CHEST TIGHTNESS: 0
DIARRHEA: 0
COUGH: 1
EYE DISCHARGE: 0
SHORTNESS OF BREATH: 0
FEVER: 0
JOINT SWELLING: 0
VOMITING: 0
ABDOMINAL PAIN: 0
NAUSEA: 0
SORE THROAT: 0
EYE ITCHING: 0
EYE PAIN: 0
FATIGUE: 1
CHILLS: 0
SINUS PAIN: 0
RHINORRHEA: 0

## 2025-01-21 NOTE — LETTER
January 21, 2025     Patient: Julio White   YOB: 1955   Date of Visit: 1/21/2025       To Whom It May Concern:    Julio White was seen in my clinic on 1/21/2025 at 12:15 pm. Please excuse Julio for his absence from work on 01/21/2025-01/22/2025. Patient can return to work 01/23/2025.    If you have any questions or concerns, please don't hesitate to call.         Sincerely,         Monica Turner PA-C        CC: No Recipients

## 2025-01-21 NOTE — PROGRESS NOTES
Subjective   Patient ID: Julio White is a 69 y.o. male. They present today with a chief complaint of Cough (Started 3 days ago /Trouble breathing after coughing ), Generalized Body Aches, and Fatigue.    History of Present Illness  Pt presented with cold symptom started on Saturday. Reports symptoms overall are getting better. Johne also sick but symptoms started after him. Denies fever, denies hx of chronic lung disease      Cough  Pertinent negatives include no chest pain, chills, ear pain, eye redness, fever, rash, rhinorrhea, sore throat or shortness of breath.   Fatigue  Associated symptoms: congestion, cough and fatigue    Associated symptoms: no abdominal pain, no chest pain, no diarrhea, no ear pain, no fever, no nausea, no rash, no rhinorrhea, no shortness of breath, no sore throat and no vomiting        Past Medical History  Allergies as of 01/21/2025    (No Known Allergies)       (Not in a hospital admission)       Past Medical History:   Diagnosis Date    Body mass index (BMI) 37.0-37.9, adult 11/01/2021    BMI 37.0-37.9, adult    Obesity, unspecified 08/10/2022    Class 2 obesity with body mass index (BMI) of 38.0 to 38.9 in adult    Other conditions influencing health status 11/01/2021    Patient new to provider    Personal history of other diseases of the circulatory system 08/04/2022    History of blood pressure problems    Personal history of other diseases of the respiratory system 10/19/2022    History of acute sinusitis    Personal history of other endocrine, nutritional and metabolic disease 11/01/2021    History of obesity       Past Surgical History:   Procedure Laterality Date    OTHER SURGICAL HISTORY  10/05/2021    Hip surgery    OTHER SURGICAL HISTORY  10/05/2021    Thyroid surgery    OTHER SURGICAL HISTORY  09/26/2022    Colonoscopy    TOTAL KNEE ARTHROPLASTY Right 11/2022        reports that he quit smoking about 3 years ago. His smoking use included cigarettes. He has never used  "smokeless tobacco. He reports current alcohol use of about 12.0 standard drinks of alcohol per week. He reports that he does not currently use drugs.    Review of Systems  Review of Systems   Constitutional:  Positive for fatigue. Negative for chills and fever.   HENT:  Positive for congestion. Negative for ear discharge, ear pain, rhinorrhea, sinus pain, sneezing and sore throat.    Eyes:  Negative for pain, discharge, redness and itching.   Respiratory:  Positive for cough. Negative for chest tightness and shortness of breath.    Cardiovascular:  Negative for chest pain.   Gastrointestinal:  Negative for abdominal pain, diarrhea, nausea and vomiting.   Musculoskeletal:  Negative for arthralgias and joint swelling.   Skin:  Negative for rash.                                  Objective    Vitals:    01/21/25 1207   BP: 136/79   BP Location: Left arm   Patient Position: Sitting   Pulse: 63   Resp: 18   SpO2: 97%   Weight: 126 kg (278 lb)   Height: 1.803 m (5' 11\")     No LMP for male patient.    Physical Exam  Constitutional:       Appearance: Normal appearance.   HENT:      Head: Normocephalic and atraumatic.      Right Ear: Tympanic membrane, ear canal and external ear normal.      Left Ear: Tympanic membrane, ear canal and external ear normal.      Nose: No rhinorrhea.   Cardiovascular:      Rate and Rhythm: Normal rate and regular rhythm.      Heart sounds: No murmur heard.  Pulmonary:      Effort: Pulmonary effort is normal. No respiratory distress.      Breath sounds: No stridor. No wheezing, rhonchi or rales.   Skin:     General: Skin is warm and dry.   Neurological:      Mental Status: He is alert and oriented to person, place, and time.   Psychiatric:         Mood and Affect: Mood normal.         Procedures    Point of Care Test & Imaging Results from this visit  Results for orders placed or performed in visit on 01/21/25   POCT Covid-19 Rapid Antigen   Result Value Ref Range    POC MICKY-COV-2 AG  Presumptive " negative test for SARS-CoV-2 (no antigen detected)     Presumptive negative test for SARS-CoV-2 (no antigen detected)   POCT Influenza A/B manually resulted   Result Value Ref Range    POC Rapid Influenza A Negative Negative    POC Rapid Influenza B Negative Negative      No results found.    Diagnostic study results (if any) were reviewed by Monica Turner PA-C.    Assessment/Plan   Allergies, medications, history, and pertinent labs/EKGs/Imaging reviewed by Monica Turner PA-C.     Medical Decision Making  NAD, COVID and Flu tests negative  Pt reports improvement with symptoms and at this point suspicious for viral syndrome and low suspicion for medical emergency condition    Orders and Diagnoses  Diagnoses and all orders for this visit:  Viral URI  Cough in adult  -     POCT Covid-19 Rapid Antigen  -     POCT Influenza A/B manually resulted      Medical Admin Record      Patient disposition: Home    Electronically signed by Monica Turner PA-C  12:26 PM

## 2025-01-24 RX ORDER — METOPROLOL SUCCINATE 100 MG/1
100 TABLET, EXTENDED RELEASE ORAL DAILY
Qty: 90 TABLET | Refills: 3 | Status: SHIPPED | OUTPATIENT
Start: 2025-01-24

## 2025-02-10 ENCOUNTER — APPOINTMENT (OUTPATIENT)
Dept: CARDIOLOGY | Facility: CLINIC | Age: 70
End: 2025-02-10
Payer: COMMERCIAL

## 2025-02-24 ENCOUNTER — ANCILLARY PROCEDURE (OUTPATIENT)
Dept: URGENT CARE | Age: 70
End: 2025-02-24
Payer: COMMERCIAL

## 2025-02-24 ENCOUNTER — OFFICE VISIT (OUTPATIENT)
Dept: URGENT CARE | Age: 70
End: 2025-02-24
Payer: COMMERCIAL

## 2025-02-24 VITALS
DIASTOLIC BLOOD PRESSURE: 97 MMHG | RESPIRATION RATE: 20 BRPM | SYSTOLIC BLOOD PRESSURE: 151 MMHG | BODY MASS INDEX: 36.57 KG/M2 | TEMPERATURE: 97.7 F | WEIGHT: 270 LBS | HEIGHT: 72 IN | HEART RATE: 74 BPM | OXYGEN SATURATION: 95 %

## 2025-02-24 DIAGNOSIS — R05.2 SUBACUTE COUGH: Primary | ICD-10-CM

## 2025-02-24 DIAGNOSIS — R05.2 SUBACUTE COUGH: ICD-10-CM

## 2025-02-24 PROCEDURE — 71046 X-RAY EXAM CHEST 2 VIEWS: CPT | Performed by: PHYSICIAN ASSISTANT

## 2025-02-24 RX ORDER — AZITHROMYCIN 250 MG/1
TABLET, FILM COATED ORAL
Qty: 6 TABLET | Refills: 0 | Status: SHIPPED | OUTPATIENT
Start: 2025-02-24

## 2025-02-24 RX ORDER — METHYLPREDNISOLONE 4 MG/1
TABLET ORAL
Qty: 21 TABLET | Refills: 0 | Status: SHIPPED | OUTPATIENT
Start: 2025-02-24

## 2025-02-24 RX ORDER — BROMPHENIRAMINE MALEATE, PSEUDOEPHEDRINE HYDROCHLORIDE, AND DEXTROMETHORPHAN HYDROBROMIDE 2; 30; 10 MG/5ML; MG/5ML; MG/5ML
5 SYRUP ORAL EVERY 4 HOURS PRN
Qty: 120 ML | Refills: 0 | Status: SHIPPED | OUTPATIENT
Start: 2025-02-24

## 2025-02-24 ASSESSMENT — PAIN SCALES - GENERAL: PAINLEVEL_OUTOF10: 2

## 2025-02-24 NOTE — PROGRESS NOTES
Subjective   Patient ID: Julio White is a 69 y.o. male who presents for Cough (Chest congestion x 10 days).  HPI  Patient presents for cough.  Patient reports 10 days of a cough that is largely present while lying down.  No fever, chills, nausea, or other constitutional signs symptoms.  No reported attempted conservative management.  Patient is a ex-smoker.  No other complaints.    Review of Systems    Constitutional:  See HPI   ENT: See HPI  Respiratory: See HPI  Neurologic:  Alert and oriented X4, No numbness, No tingling.    All other systems are negative     Objective     BP (!) 151/97 (BP Location: Right arm, Patient Position: Sitting)   Pulse 74   Temp 36.5 °C (97.7 °F) (Oral)   Resp 20   Ht 1.829 m (6')   Wt 122 kg (270 lb)   SpO2 95%   BMI 36.62 kg/m²     Physical Exam    General:  Alert and oriented, No acute distress.    Eye:  Pupils are equal, round and reactive to light, Normal conjunctiva.    HENT:  Normocephalic, unremarkable oropharynx; no send sinus tenderness; no cervical adenopathy; no nasal congestion  Neck:  Supple    Respiratory: Respirations are non-labored; LCTA bilaterally  Musculoskeletal: Normal ROM and strength  Integumentary:  Warm, Dry, Intact, No pallor, No rash.    Neurologic:  Alert, Oriented, Normal sensory, Cranial Nerves II-XII are grossly intact  Psychiatric:  Cooperative, Appropriate mood & affect.    Assessment/Plan   Exam and presentation are unremarkable.  Patient requesting chest x-ray so this was obliged.  Chest x-ray was unremarkable.  Considering the duration of the cough, will treat with  Z-Viet, Medrol, and Bromfed.  Patient's clinical presentation is otherwise unremarkable at this time. Patient is discharged with instructions to follow-up with primary care or seek emergency medical attention for worsening symptoms or any new concerns.  Problem List Items Addressed This Visit    None  Visit Diagnoses       Subacute cough    -  Primary    Relevant Orders    XR  chest 2 views            Final diagnoses:   [R05.2] Subacute cough

## 2025-02-26 DIAGNOSIS — R00.2 PALPITATIONS: ICD-10-CM

## 2025-02-26 RX ORDER — METOPROLOL SUCCINATE 100 MG/1
100 TABLET, EXTENDED RELEASE ORAL DAILY
Qty: 90 TABLET | Refills: 3 | Status: SHIPPED | OUTPATIENT
Start: 2025-02-26

## 2025-05-12 ENCOUNTER — APPOINTMENT (OUTPATIENT)
Dept: PRIMARY CARE | Facility: CLINIC | Age: 70
End: 2025-05-12
Payer: COMMERCIAL

## 2025-05-14 ENCOUNTER — OFFICE VISIT (OUTPATIENT)
Dept: PRIMARY CARE | Facility: CLINIC | Age: 70
End: 2025-05-14
Payer: COMMERCIAL

## 2025-05-14 VITALS
WEIGHT: 276.4 LBS | HEART RATE: 73 BPM | SYSTOLIC BLOOD PRESSURE: 125 MMHG | DIASTOLIC BLOOD PRESSURE: 80 MMHG | BODY MASS INDEX: 37.44 KG/M2 | TEMPERATURE: 98.8 F | HEIGHT: 72 IN

## 2025-05-14 DIAGNOSIS — I26.93 SINGLE SUBSEGMENTAL PULMONARY EMBOLISM WITHOUT ACUTE COR PULMONALE: ICD-10-CM

## 2025-05-14 DIAGNOSIS — R73.01 IFG (IMPAIRED FASTING GLUCOSE): ICD-10-CM

## 2025-05-14 DIAGNOSIS — I49.3 PVC (PREMATURE VENTRICULAR CONTRACTION): ICD-10-CM

## 2025-05-14 DIAGNOSIS — I10 HYPERTENSION, ESSENTIAL, BENIGN: ICD-10-CM

## 2025-05-14 DIAGNOSIS — Z87.891 FORMER SMOKER: ICD-10-CM

## 2025-05-14 DIAGNOSIS — L29.9 EAR ITCH: ICD-10-CM

## 2025-05-14 DIAGNOSIS — I10 ESSENTIAL (PRIMARY) HYPERTENSION: ICD-10-CM

## 2025-05-14 DIAGNOSIS — E66.812 CLASS 2 OBESITY WITH BODY MASS INDEX (BMI) OF 37.0 TO 37.9 IN ADULT, UNSPECIFIED OBESITY TYPE, UNSPECIFIED WHETHER SERIOUS COMORBIDITY PRESENT: ICD-10-CM

## 2025-05-14 DIAGNOSIS — E03.9 ACQUIRED HYPOTHYROIDISM: ICD-10-CM

## 2025-05-14 DIAGNOSIS — E03.9 HYPOTHYROIDISM, UNSPECIFIED: ICD-10-CM

## 2025-05-14 PROCEDURE — 1036F TOBACCO NON-USER: CPT | Performed by: FAMILY MEDICINE

## 2025-05-14 PROCEDURE — 1159F MED LIST DOCD IN RCRD: CPT | Performed by: FAMILY MEDICINE

## 2025-05-14 PROCEDURE — 1158F ADVNC CARE PLAN TLK DOCD: CPT | Performed by: FAMILY MEDICINE

## 2025-05-14 PROCEDURE — 3008F BODY MASS INDEX DOCD: CPT | Performed by: FAMILY MEDICINE

## 2025-05-14 PROCEDURE — 3079F DIAST BP 80-89 MM HG: CPT | Performed by: FAMILY MEDICINE

## 2025-05-14 PROCEDURE — 1160F RVW MEDS BY RX/DR IN RCRD: CPT | Performed by: FAMILY MEDICINE

## 2025-05-14 PROCEDURE — 99213 OFFICE O/P EST LOW 20 MIN: CPT | Performed by: FAMILY MEDICINE

## 2025-05-14 PROCEDURE — 3074F SYST BP LT 130 MM HG: CPT | Performed by: FAMILY MEDICINE

## 2025-05-14 RX ORDER — LISINOPRIL 5 MG/1
5 TABLET ORAL DAILY
Qty: 90 TABLET | Refills: 3 | Status: SHIPPED | OUTPATIENT
Start: 2025-05-14

## 2025-05-14 RX ORDER — FLUOCINOLONE ACETONIDE 0.11 MG/ML
5 OIL AURICULAR (OTIC) 2 TIMES DAILY
Qty: 20 ML | Refills: 3 | Status: SHIPPED | OUTPATIENT
Start: 2025-05-14

## 2025-05-14 RX ORDER — LEVOTHYROXINE SODIUM 125 UG/1
125 TABLET ORAL DAILY
Qty: 90 TABLET | Refills: 3 | Status: SHIPPED | OUTPATIENT
Start: 2025-05-14

## 2025-05-14 ASSESSMENT — ENCOUNTER SYMPTOMS
RESPIRATORY NEGATIVE: 1
GASTROINTESTINAL NEGATIVE: 1
CARDIOVASCULAR NEGATIVE: 1
CONSTITUTIONAL NEGATIVE: 1
MUSCULOSKELETAL NEGATIVE: 1
ALLERGIC/IMMUNOLOGIC NEGATIVE: 1
ENDOCRINE NEGATIVE: 1
HEMATOLOGIC/LYMPHATIC NEGATIVE: 1
PSYCHIATRIC NEGATIVE: 1
EYES NEGATIVE: 1

## 2025-05-14 ASSESSMENT — PATIENT HEALTH QUESTIONNAIRE - PHQ9
SUM OF ALL RESPONSES TO PHQ9 QUESTIONS 1 AND 2: 0
1. LITTLE INTEREST OR PLEASURE IN DOING THINGS: NOT AT ALL
2. FEELING DOWN, DEPRESSED OR HOPELESS: NOT AT ALL

## 2025-05-14 NOTE — PROGRESS NOTES
Isrrael Tan is here for follow-up on hypertension hyperlipidemia and hypothyroidism.  He states that he is overall been feeling well.  His only complaint is that of bilateral itchy ears on occasion.  He swims 4 times a week although he does not notice any correlation with the swimming and his itchy ears.  He has had no hearing problems nor any ear drainage.  Otherwise he has no complaints.  He continues on his meds noted    Patient ID: Julio White is a 69 y.o. male who presents for Follow-up (6m follow up):    Problem List Items Addressed This Visit    None     Medical History[1]   Surgical History[2]   Family History[3]   Social History     Socioeconomic History    Marital status:      Spouse name: Not on file    Number of children: Not on file    Years of education: Not on file    Highest education level: Not on file   Occupational History    Not on file   Tobacco Use    Smoking status: Former     Types: Cigarettes     Start date:      Quit date:      Years since quittin.3    Smokeless tobacco: Never   Substance and Sexual Activity    Alcohol use: Yes     Alcohol/week: 12.0 standard drinks of alcohol     Types: 12 Cans of beer per week     Comment: weekly    Drug use: Not Currently    Sexual activity: Not on file   Other Topics Concern    Not on file   Social History Narrative    Not on file     Social Drivers of Health     Financial Resource Strain: Not on file   Food Insecurity: Not on file   Transportation Needs: Not on file   Physical Activity: Not on file   Stress: Not on file   Social Connections: Not on file   Intimate Partner Violence: Not on file   Housing Stability: Not on file      Patient has no known allergies.   Current Medications[4]    Immunization History   Administered Date(s) Administered    Flu vaccine (IIV4), preservative free *Check age/dose* 2018, 10/01/2019, 10/07/2022    Flu vaccine, trivalent, preservative free, HIGH-DOSE, age 65y+ (Fluzone) 10/19/2021     Influenza, Unspecified 10/29/2009, 10/25/2011, 10/01/2014, 10/01/2015, 10/01/2016, 10/01/2021    Influenza, seasonal, injectable 10/01/2020, 10/01/2023    Moderna SARS-CoV-2 Vaccination 12/31/2020, 02/11/2021, 02/28/2021, 11/19/2021    Novel influenza-H1N1-09, preservative-free 11/05/2009, 10/07/2022    Pfizer Purple Cap SARS-CoV-2 09/20/2022    Pneumococcal Conjugate PCV 7 1955    Pneumococcal conjugate vaccine, 13-valent (PREVNAR 13) 10/19/2021    Pneumococcal polysaccharide vaccine, 23-valent, age 2 years and older (PNEUMOVAX 23) 10/27/2022        Review of Systems   Constitutional: Negative.    HENT: Negative.     Eyes: Negative.    Respiratory: Negative.     Cardiovascular: Negative.    Gastrointestinal: Negative.    Endocrine: Negative.    Genitourinary: Negative.    Musculoskeletal: Negative.    Skin: Negative.    Allergic/Immunologic: Negative.    Hematological: Negative.    Psychiatric/Behavioral: Negative.     All other systems reviewed and are negative.       Vitals:    05/14/25 0904   BP: 125/80   Pulse: 73   Temp: 37.1 °C (98.8 °F)     Vitals:    05/14/25 0904   Weight: 125 kg (276 lb 6.4 oz)      Physical Exam  Constitutional:       General: He is not in acute distress.     Appearance: Normal appearance.   Cardiovascular:      Rate and Rhythm: Normal rate and regular rhythm.      Pulses: Normal pulses.      Heart sounds: Normal heart sounds. No murmur heard.     No friction rub. No gallop.   Pulmonary:      Effort: Pulmonary effort is normal. No respiratory distress.      Breath sounds: Normal breath sounds. No wheezing or rales.   Neurological:      Mental Status: He is alert.     Ears-both ear canals are normal-appearing with very minimal debris and minimal cerumen.  There is no drainage redness or swelling.  The TMs are also normal.    ASSESSMENT/PLAN: Bilateral ear itching.  Consider using earplugs when swimming.  May use fluocinolone otic drops as needed.  Call if any  worsening.    Hypertension stable.  Continue lisinopril and metoprolol as noted.    Hyperlipidemia.  Check lipid profile in November 2025    Hypothyroid.  Continue levothyroxine daily.  Check TSH    Cardiac arrhythmia followed by cardiology and electrophysiology    Aortic root aneurysm followed by cardiology.  CT angio up-to-date.    Also obtain CBC CMP and PSA in November 2025.  Recommended shingles vaccine    Follow-up 6 months and call as needed       Scribe Attestation  By signing my name below, I, Salma Camara LPN, Scribe   attest that this documentation has been prepared under the direction and in the presence of Jim Keller MD.         [1]   Past Medical History:  Diagnosis Date    Body mass index (BMI) 37.0-37.9, adult 11/01/2021    BMI 37.0-37.9, adult    Obesity, unspecified 08/10/2022    Class 2 obesity with body mass index (BMI) of 38.0 to 38.9 in adult    Other conditions influencing health status 11/01/2021    Patient new to provider    Personal history of other diseases of the circulatory system 08/04/2022    History of blood pressure problems    Personal history of other diseases of the respiratory system 10/19/2022    History of acute sinusitis    Personal history of other endocrine, nutritional and metabolic disease 11/01/2021    History of obesity   [2]   Past Surgical History:  Procedure Laterality Date    OTHER SURGICAL HISTORY  10/05/2021    Hip surgery    OTHER SURGICAL HISTORY  10/05/2021    Thyroid surgery    OTHER SURGICAL HISTORY  09/26/2022    Colonoscopy    TOTAL KNEE ARTHROPLASTY Right 11/2022   [3]   Family History  Problem Relation Name Age of Onset    Cancer Mother      Other (cardiac disorder) Father     [4]   Current Outpatient Medications   Medication Sig Dispense Refill    docosahexaenoic acid/epa (FISH OIL ORAL) Take 400 mg by mouth once daily.      Eliquis 5 mg tablet TAKE 1 TABLET BY MOUTH TWICE A DAY (Patient taking differently: Take 1 tablet (5 mg) by mouth once  daily.) 180 tablet 1    levothyroxine (Synthroid, Levoxyl) 125 mcg tablet TAKE 1 TABLET BY MOUTH EVERY DAY 90 tablet 1    lisinopril 5 mg tablet TAKE 1 TABLET BY MOUTH EVERY DAY 90 tablet 1    magnesium oxide (Mag-Ox) 400 mg (241.3 mg magnesium) tablet TAKE 1 TABLET BY MOUTH TWICE A  tablet 0    metoprolol succinate XL (Toprol-XL) 100 mg 24 hr tablet TAKE 1 TABLET BY MOUTH EVERY DAY 90 tablet 3    multivitamin tablet Take 1 tablet by mouth once daily.      polyethylene glycol (Miralax) 17 gram/dose powder Mix 17 g of powder and drink once daily as needed.      azithromycin (Zithromax) 250 mg tablet 2 tabs po day 1; 1 tab po every day days 2-5 (Patient not taking: Reported on 5/14/2025) 6 tablet 0    brompheniramine-pseudoeph-DM (Bromfed DM) 2-30-10 mg/5 mL syrup Take 5 mL by mouth every 4 hours if needed for allergies, congestion or cough. (Patient not taking: Reported on 5/14/2025) 120 mL 0    clindamycin (Cleocin T) 1 % external solution 1 Application. (Patient not taking: Reported on 5/14/2025)      clindamycin (Cleocin T) 1 % lotion Clindamycin Phosphate 1 % External Lotion   Refills: 0       Active (Patient not taking: Reported on 5/14/2025)      methylPREDNISolone (Medrol Dospak) 4 mg tablets Take as directed on package. (Patient not taking: Reported on 5/14/2025) 21 tablet 0     No current facility-administered medications for this visit.

## 2025-06-23 ENCOUNTER — OFFICE VISIT (OUTPATIENT)
Dept: PRIMARY CARE | Facility: CLINIC | Age: 70
End: 2025-06-23
Payer: COMMERCIAL

## 2025-06-23 ENCOUNTER — TELEPHONE (OUTPATIENT)
Dept: PRIMARY CARE | Facility: CLINIC | Age: 70
End: 2025-06-23

## 2025-06-23 ENCOUNTER — LAB REQUISITION (OUTPATIENT)
Dept: LAB | Facility: HOSPITAL | Age: 70
End: 2025-06-23

## 2025-06-23 VITALS
SYSTOLIC BLOOD PRESSURE: 110 MMHG | BODY MASS INDEX: 36.98 KG/M2 | DIASTOLIC BLOOD PRESSURE: 72 MMHG | HEART RATE: 71 BPM | HEIGHT: 72 IN | WEIGHT: 273 LBS

## 2025-06-23 DIAGNOSIS — M79.604 RIGHT LEG PAIN: Primary | ICD-10-CM

## 2025-06-23 DIAGNOSIS — M79.604 PAIN IN RIGHT LEG: ICD-10-CM

## 2025-06-23 LAB — D DIMER PPP FEU-MCNC: 391 NG/ML FEU

## 2025-06-23 PROCEDURE — 85379 FIBRIN DEGRADATION QUANT: CPT

## 2025-06-23 PROCEDURE — 3078F DIAST BP <80 MM HG: CPT | Performed by: FAMILY MEDICINE

## 2025-06-23 PROCEDURE — 1159F MED LIST DOCD IN RCRD: CPT | Performed by: FAMILY MEDICINE

## 2025-06-23 PROCEDURE — 3074F SYST BP LT 130 MM HG: CPT | Performed by: FAMILY MEDICINE

## 2025-06-23 PROCEDURE — 99213 OFFICE O/P EST LOW 20 MIN: CPT | Performed by: FAMILY MEDICINE

## 2025-06-23 PROCEDURE — 3008F BODY MASS INDEX DOCD: CPT | Performed by: FAMILY MEDICINE

## 2025-06-23 ASSESSMENT — ENCOUNTER SYMPTOMS
FATIGUE: 0
CHILLS: 0
HEADACHES: 0
SHORTNESS OF BREATH: 0
DIZZINESS: 0
CHEST TIGHTNESS: 0

## 2025-06-23 ASSESSMENT — PATIENT HEALTH QUESTIONNAIRE - PHQ9
1. LITTLE INTEREST OR PLEASURE IN DOING THINGS: NOT AT ALL
SUM OF ALL RESPONSES TO PHQ9 QUESTIONS 1 AND 2: 0
2. FEELING DOWN, DEPRESSED OR HOPELESS: NOT AT ALL

## 2025-06-23 NOTE — PROGRESS NOTES
Subjective   Patient ID: Julio White is a 69 y.o. male who presents for Sick Visit (Thinks he has a blood clot in right leg).    R leg pain   - reports approximately 4 days ago he started having pain in the R leg   - did have a knee replacement 3 years ago   - R leg has been swollen, firm and tender to the touch   - has a history of having a blood clot in the past   - reports it feels like the knee   - does have pain with ambulation   - currently on elequis but only taking one tablet daily so he has not been taking as prescribed          Review of Systems   Constitutional:  Negative for chills and fatigue.   Respiratory:  Negative for chest tightness and shortness of breath.    Neurological:  Negative for dizziness and headaches.       Objective   /72   Pulse 71   Ht 1.829 m (6')   Wt 124 kg (273 lb)   BMI 37.03 kg/m²     Physical Exam  Constitutional:       Appearance: Normal appearance.   Pulmonary:      Effort: Pulmonary effort is normal.      Breath sounds: Normal breath sounds.   Neurological:      Mental Status: He is alert.   Psychiatric:         Mood and Affect: Mood normal.         Behavior: Behavior normal.         Assessment/Plan   Problem List Items Addressed This Visit    None  Visit Diagnoses         Codes      Right leg pain    -  Primary M79.604    stable  - check d-dimer   - consider US if positive     Relevant Orders    D-dimer, quantitative

## 2025-06-23 NOTE — TELEPHONE ENCOUNTER
----- Message from Raúl Bach sent at 6/23/2025 11:59 AM EDT -----  D-dimer negative, unlikely to be a blood clot.  I would keep your follow up with the orthopedic surgeon   ----- Message -----  From: Lab, Background User  Sent: 6/23/2025  11:10 AM EDT  To: Raúl Bach MD

## 2025-11-10 ENCOUNTER — APPOINTMENT (OUTPATIENT)
Dept: CARDIOLOGY | Facility: CLINIC | Age: 70
End: 2025-11-10
Payer: COMMERCIAL

## 2025-11-13 ENCOUNTER — APPOINTMENT (OUTPATIENT)
Dept: PRIMARY CARE | Facility: CLINIC | Age: 70
End: 2025-11-13
Payer: COMMERCIAL